# Patient Record
Sex: FEMALE | Race: WHITE
[De-identification: names, ages, dates, MRNs, and addresses within clinical notes are randomized per-mention and may not be internally consistent; named-entity substitution may affect disease eponyms.]

---

## 2018-05-24 ENCOUNTER — HOSPITAL ENCOUNTER (OUTPATIENT)
Dept: HOSPITAL 74 - FASU | Age: 48
Discharge: HOME | End: 2018-05-24
Attending: ORTHOPAEDIC SURGERY
Payer: COMMERCIAL

## 2018-05-24 VITALS — SYSTOLIC BLOOD PRESSURE: 121 MMHG | DIASTOLIC BLOOD PRESSURE: 79 MMHG | TEMPERATURE: 97.9 F | HEART RATE: 70 BPM

## 2018-05-24 VITALS — BODY MASS INDEX: 37.6 KG/M2

## 2018-05-24 DIAGNOSIS — S83.241A: Primary | ICD-10-CM
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
## 2018-05-24 PROCEDURE — 0SBC4ZZ EXCISION OF RIGHT KNEE JOINT, PERCUTANEOUS ENDOSCOPIC APPROACH: ICD-10-PCS | Performed by: ORTHOPAEDIC SURGERY

## 2018-05-30 NOTE — OP
DATE OF OPERATION:  05/24/2018

 

PREOPERATIVE DIAGNOSIS:  Right knee medial meniscal tear.

 

POSTOPERATIVE DIAGNOSIS:  Right knee medial meniscal tear.

 

PROCEDURE:  Right knee arthroscopy with partial medial meniscectomy.

 

ANESTHESIA:  General.

 

POSTOPERATIVE CONDITION:  Stable.

 

COMPLICATIONS:  None.

 

INDICATIONS:  This is a pleasant 48-year-old female who has been suffering from

medial knee pain.  Treatment options including nonoperative versus operative

treatment were reviewed.  Operative risks were reviewed in detail including bleeding,

infection, neurovascular injury, need for further surgery, postop pain and stiffness,

progression of osteoarthritis, iatrogenic cartilage injury.  We discussed medical

risks such as heart attack, stroke, DVT, PE, and death.  I addressed all the

patient's questions and concerns.  She voiced understanding and elected to proceed.

 

DESCRIPTION OF PROCEDURE:  The patient was brought to the operating room where

general anesthesia was administered.  The right lower extremity was then prepped and

draped in the usual sterile fashion.  A preoperative dose of antibiotics was given,

and the usual timeout procedure was performed.  The portal sites were now injected

subcutaneously with 0.25% Marcaine.  An 11 blade was used to establish the lateral

portal.  The arthroscope was now passed into the knee.  Examination of the

patellofemoral joint demonstrated some mild articular wear, more so laterally on the

patellar surface.  The trochlea was unremarkable.  Passing the arthroscope into the

notch demonstrated intact ACL and PCL.  Passing the arthroscope into the medial

compartment, a medial portal was established under spinal needle localization.  There

was high-grade partial thickness fissuring along the medial femoral condyle articular

surface.  On the posterior horn of the medial meniscus, a complex tear was noted. 

Utilizing a combination of meniscal biter and shaver, this was debrided down to a

stable base.  The arthroscope was now passed in the lateral compartment.  Here, there

were no articular lesions and no meniscal lesions seen upon probing.  The excess

fluid was now withdrawn from the joint.  The portals were sutured using 3-0 nylon. 

Sterile dressings were placed.  The patient was extubated and transferred to recovery

room in stable condition.

 

 

MITZY [id number]

DD: 05/24/2018 11:30

DT: 05/24/2018 13:33

Job #:  [id number]

## 2021-05-20 ENCOUNTER — HOSPITAL ENCOUNTER (EMERGENCY)
Facility: HOSPITAL | Age: 51
Discharge: HOME | End: 2021-05-20
Attending: EMERGENCY MEDICINE
Payer: COMMERCIAL

## 2021-05-20 ENCOUNTER — APPOINTMENT (EMERGENCY)
Dept: CT IMAGING | Facility: HOSPITAL | Age: 51
End: 2021-05-20
Payer: COMMERCIAL

## 2021-05-20 VITALS
TEMPERATURE: 97.6 F | RESPIRATION RATE: 22 BRPM | HEART RATE: 92 BPM | DIASTOLIC BLOOD PRESSURE: 65 MMHG | HEIGHT: 62 IN | OXYGEN SATURATION: 97 % | SYSTOLIC BLOOD PRESSURE: 132 MMHG | BODY MASS INDEX: 39.56 KG/M2 | WEIGHT: 215 LBS

## 2021-05-20 DIAGNOSIS — N20.1 URETEROLITHIASIS: Primary | ICD-10-CM

## 2021-05-20 DIAGNOSIS — R10.9 FLANK PAIN: ICD-10-CM

## 2021-05-20 LAB
ANION GAP SERPL CALCULATED.3IONS-SCNC: 6 MMOL/L (ref 4–13)
BACTERIA UR QL AUTO: ABNORMAL /HPF
BASOPHILS # BLD AUTO: 0.02 THOUSANDS/ΜL (ref 0–0.1)
BASOPHILS NFR BLD AUTO: 0 % (ref 0–1)
BILIRUB UR QL STRIP: NEGATIVE
BUN SERPL-MCNC: 16 MG/DL (ref 5–25)
CALCIUM SERPL-MCNC: 8.8 MG/DL (ref 8.3–10.1)
CHLORIDE SERPL-SCNC: 105 MMOL/L (ref 100–108)
CLARITY UR: ABNORMAL
CO2 SERPL-SCNC: 30 MMOL/L (ref 21–32)
COLOR UR: YELLOW
CREAT SERPL-MCNC: 0.84 MG/DL (ref 0.6–1.3)
EOSINOPHIL # BLD AUTO: 0.19 THOUSAND/ΜL (ref 0–0.61)
EOSINOPHIL NFR BLD AUTO: 2 % (ref 0–6)
ERYTHROCYTE [DISTWIDTH] IN BLOOD BY AUTOMATED COUNT: 13.9 % (ref 11.6–15.1)
EXT PREG TEST URINE: NEGATIVE
EXT. CONTROL ED NAV: NORMAL
GFR SERPL CREATININE-BSD FRML MDRD: 81 ML/MIN/1.73SQ M
GLUCOSE SERPL-MCNC: 147 MG/DL (ref 65–140)
GLUCOSE UR STRIP-MCNC: NEGATIVE MG/DL
HCT VFR BLD AUTO: 38.3 % (ref 34.8–46.1)
HGB BLD-MCNC: 12.1 G/DL (ref 11.5–15.4)
HGB UR QL STRIP.AUTO: ABNORMAL
IMM GRANULOCYTES # BLD AUTO: 0.02 THOUSAND/UL (ref 0–0.2)
IMM GRANULOCYTES NFR BLD AUTO: 0 % (ref 0–2)
KETONES UR STRIP-MCNC: NEGATIVE MG/DL
LEUKOCYTE ESTERASE UR QL STRIP: ABNORMAL
LYMPHOCYTES # BLD AUTO: 1.31 THOUSANDS/ΜL (ref 0.6–4.47)
LYMPHOCYTES NFR BLD AUTO: 11 % (ref 14–44)
MCH RBC QN AUTO: 28.5 PG (ref 26.8–34.3)
MCHC RBC AUTO-ENTMCNC: 31.6 G/DL (ref 31.4–37.4)
MCV RBC AUTO: 90 FL (ref 82–98)
MONOCYTES # BLD AUTO: 0.63 THOUSAND/ΜL (ref 0.17–1.22)
MONOCYTES NFR BLD AUTO: 5 % (ref 4–12)
MUCOUS THREADS UR QL AUTO: ABNORMAL
NEUTROPHILS # BLD AUTO: 9.99 THOUSANDS/ΜL (ref 1.85–7.62)
NEUTS SEG NFR BLD AUTO: 82 % (ref 43–75)
NITRITE UR QL STRIP: NEGATIVE
NON-SQ EPI CELLS URNS QL MICRO: ABNORMAL /HPF
PH UR STRIP.AUTO: 6 [PH]
PLATELET # BLD AUTO: 298 THOUSANDS/UL (ref 149–390)
PMV BLD AUTO: 10.2 FL (ref 8.9–12.7)
POTASSIUM SERPL-SCNC: 3.9 MMOL/L (ref 3.5–5.3)
PROT UR STRIP-MCNC: NEGATIVE MG/DL
RBC # BLD AUTO: 4.24 MILLION/UL (ref 3.81–5.12)
RBC #/AREA URNS AUTO: ABNORMAL /HPF
SODIUM SERPL-SCNC: 141 MMOL/L (ref 136–145)
SP GR UR STRIP.AUTO: >=1.03 (ref 1–1.03)
UROBILINOGEN UR QL STRIP.AUTO: 0.2 E.U./DL
WBC # BLD AUTO: 12.16 THOUSAND/UL (ref 4.31–10.16)
WBC #/AREA URNS AUTO: ABNORMAL /HPF

## 2021-05-20 PROCEDURE — 36415 COLL VENOUS BLD VENIPUNCTURE: CPT | Performed by: EMERGENCY MEDICINE

## 2021-05-20 PROCEDURE — 96375 TX/PRO/DX INJ NEW DRUG ADDON: CPT

## 2021-05-20 PROCEDURE — 99284 EMERGENCY DEPT VISIT MOD MDM: CPT | Performed by: EMERGENCY MEDICINE

## 2021-05-20 PROCEDURE — 85025 COMPLETE CBC W/AUTO DIFF WBC: CPT | Performed by: EMERGENCY MEDICINE

## 2021-05-20 PROCEDURE — 96374 THER/PROPH/DIAG INJ IV PUSH: CPT

## 2021-05-20 PROCEDURE — 81025 URINE PREGNANCY TEST: CPT | Performed by: EMERGENCY MEDICINE

## 2021-05-20 PROCEDURE — 81001 URINALYSIS AUTO W/SCOPE: CPT | Performed by: EMERGENCY MEDICINE

## 2021-05-20 PROCEDURE — 99284 EMERGENCY DEPT VISIT MOD MDM: CPT

## 2021-05-20 PROCEDURE — 80048 BASIC METABOLIC PNL TOTAL CA: CPT | Performed by: EMERGENCY MEDICINE

## 2021-05-20 PROCEDURE — 74176 CT ABD & PELVIS W/O CONTRAST: CPT

## 2021-05-20 RX ORDER — NAPROXEN 500 MG/1
500 TABLET ORAL 2 TIMES DAILY WITH MEALS
Qty: 14 TABLET | Refills: 0 | Status: SHIPPED | OUTPATIENT
Start: 2021-05-20 | End: 2021-06-10 | Stop reason: SDUPTHER

## 2021-05-20 RX ORDER — KETOROLAC TROMETHAMINE 30 MG/ML
15 INJECTION, SOLUTION INTRAMUSCULAR; INTRAVENOUS ONCE
Status: COMPLETED | OUTPATIENT
Start: 2021-05-20 | End: 2021-05-20

## 2021-05-20 RX ORDER — ONDANSETRON 4 MG/1
4 TABLET, ORALLY DISINTEGRATING ORAL EVERY 6 HOURS PRN
Qty: 20 TABLET | Refills: 0 | Status: SHIPPED | OUTPATIENT
Start: 2021-05-20

## 2021-05-20 RX ORDER — LEVOTHYROXINE SODIUM 0.1 MG/1
125 TABLET ORAL DAILY
COMMUNITY

## 2021-05-20 RX ORDER — OXYCODONE HYDROCHLORIDE 5 MG/1
5 TABLET ORAL EVERY 4 HOURS PRN
Qty: 8 TABLET | Refills: 0 | Status: SHIPPED | OUTPATIENT
Start: 2021-05-20 | End: 2021-05-30

## 2021-05-20 RX ORDER — MORPHINE SULFATE 4 MG/ML
4 INJECTION, SOLUTION INTRAMUSCULAR; INTRAVENOUS ONCE
Status: COMPLETED | OUTPATIENT
Start: 2021-05-20 | End: 2021-05-20

## 2021-05-20 RX ORDER — ONDANSETRON 2 MG/ML
4 INJECTION INTRAMUSCULAR; INTRAVENOUS ONCE
Status: COMPLETED | OUTPATIENT
Start: 2021-05-20 | End: 2021-05-20

## 2021-05-20 RX ADMIN — MORPHINE SULFATE 4 MG: 4 INJECTION INTRAVENOUS at 03:49

## 2021-05-20 RX ADMIN — ONDANSETRON 4 MG: 2 INJECTION INTRAMUSCULAR; INTRAVENOUS at 03:49

## 2021-05-20 RX ADMIN — KETOROLAC TROMETHAMINE 15 MG: 30 INJECTION, SOLUTION INTRAMUSCULAR at 03:49

## 2021-05-20 NOTE — ED PROVIDER NOTES
History  Chief Complaint   Patient presents with    Flank Pain     Pt to ED with c/o of right flank pain, pt states she has a history of kidney stones     49-year-old female past medical history of hypothyroidism presents for evaluation of right flank pain which started at midnight tonight, radiates to the suprapubic region, constant, sharp, associated nausea but no vomiting, with no relieving exacerbating factors, feels like similar kidney stones which required lithotripsy approximately 4 years ago at [facility] in [place],  Otherwise the patient is healthy, no history of gallbladder disease  No current chest pain or shortness of breath, no fever, no urinary complaint  SHe did not take any medications prior to arrival          Prior to Admission Medications   Prescriptions Last Dose Informant Patient Reported? Taking?   levothyroxine 100 mcg tablet   Yes Yes   Sig: Take 125 mcg by mouth daily      Facility-Administered Medications: None       Past Medical History:   Diagnosis Date    Hyperthyroidism        Past Surgical History:   Procedure Laterality Date    INGUINAL HERNIA REPAIR      KIDNEY STONE SURGERY         History reviewed  No pertinent family history  I have reviewed and agree with the history as documented  E-Cigarette/Vaping    E-Cigarette Use Never User      E-Cigarette/Vaping Substances    Nicotine No     THC No     CBD No     Flavoring No     Other No     Unknown No      Social History     Tobacco Use    Smoking status: Never Smoker    Smokeless tobacco: Never Used   Substance Use Topics    Alcohol use: Never     Frequency: Never    Drug use: Never       Review of Systems   Constitutional: Negative for appetite change and fever  HENT: Negative for rhinorrhea and sore throat  Eyes: Negative for photophobia and visual disturbance  Respiratory: Negative for cough, chest tightness and wheezing  Cardiovascular: Negative for chest pain, palpitations and leg swelling  Gastrointestinal: Negative for abdominal distention, abdominal pain, blood in stool, constipation and diarrhea  Genitourinary: Positive for flank pain  Negative for dysuria, frequency, hematuria and urgency  Musculoskeletal: Negative for back pain  Skin: Negative for rash  Neurological: Negative for dizziness, weakness and headaches  All other systems reviewed and are negative  Physical Exam  Physical Exam  Vitals signs and nursing note reviewed  Constitutional:       Appearance: She is well-developed  Comments: Uncomfortable appearing female in no acute respiratory distress   HENT:      Head: Normocephalic and atraumatic  Eyes:      Pupils: Pupils are equal, round, and reactive to light  Neck:      Musculoskeletal: Normal range of motion and neck supple  Cardiovascular:      Rate and Rhythm: Normal rate and regular rhythm  Heart sounds: No murmur  No friction rub  No gallop  Pulmonary:      Effort: Pulmonary effort is normal       Breath sounds: No wheezing or rales  Chest:      Chest wall: No tenderness  Abdominal:      General: There is no distension  Palpations: Abdomen is soft  There is no mass  Tenderness: There is no abdominal tenderness  There is no guarding or rebound  Comments: Soft nontender abdomen, right CVA tenderness   Skin:     General: Skin is warm and dry  Neurological:      Mental Status: She is alert and oriented to person, place, and time           Vital Signs  ED Triage Vitals   Temperature Pulse Respirations Blood Pressure SpO2   05/20/21 0342 05/20/21 0342 05/20/21 0342 05/20/21 0342 05/20/21 0342   97 6 °F (36 4 °C) 92 22 132/65 97 %      Temp src Heart Rate Source Patient Position - Orthostatic VS BP Location FiO2 (%)   -- -- -- -- --             Pain Score       05/20/21 0349       Worst Possible Pain           Vitals:    05/20/21 0342   BP: 132/65   Pulse: 92         Visual Acuity      ED Medications  Medications   ketorolac (TORADOL) injection 15 mg (15 mg Intravenous Given 5/20/21 0349)   morphine (PF) 4 mg/mL injection 4 mg (4 mg Intravenous Given 5/20/21 0349)   ondansetron (ZOFRAN) injection 4 mg (4 mg Intravenous Given 5/20/21 0349)       Diagnostic Studies  Results Reviewed     Procedure Component Value Units Date/Time    Basic metabolic panel [[id number]]  (Abnormal) Collected: 05/20/21 0348    Lab Status: Final result Specimen: Blood from Arm, Right Updated: 05/20/21 0409     Sodium 141 mmol/L      Potassium 3 9 mmol/L      Chloride 105 mmol/L      CO2 30 mmol/L      ANION GAP 6 mmol/L      BUN 16 mg/dL      Creatinine 0 84 mg/dL      Glucose 147 mg/dL      Calcium 8 8 mg/dL      eGFR 81 ml/min/1 73sq m     Narrative:      [place] guidelines for Chronic Kidney Disease (CKD):     Stage 1 with normal or high GFR (GFR > 90 mL/min/1 73 square meters)    Stage 2 Mild CKD (GFR = 60-89 mL/min/1 73 square meters)    Stage 3A Moderate CKD (GFR = 45-59 mL/min/1 73 square meters)    Stage 3B Moderate CKD (GFR = 30-44 mL/min/1 73 square meters)    Stage 4 Severe CKD (GFR = 15-29 mL/min/1 73 square meters)    Stage 5 End Stage CKD (GFR <15 mL/min/1 73 square meters)  Note: GFR calculation is accurate only with a steady state creatinine    Urine Microscopic [[id number]]  (Abnormal) Collected: 05/20/21 0344    Lab Status: Final result Specimen: Urine, Clean Catch Updated: 05/20/21 0358     RBC, UA 4-10 /hpf      WBC, UA 4-10 /hpf      Epithelial Cells Moderate /hpf      Bacteria, UA Occasional /hpf      MUCUS THREADS Occasional    CBC and differential [[id number]]  (Abnormal) Collected: 05/20/21 0348    Lab Status: Final result Specimen: Blood from Arm, Right Updated: 05/20/21 0356     WBC 12 16 Thousand/uL      RBC 4 24 Million/uL      Hemoglobin 12 1 g/dL      Hematocrit 38 3 %      MCV 90 fL      MCH 28 5 pg      MCHC 31 6 g/dL      RDW 13 9 %      MPV 10 2 fL      Platelets 263 Thousands/uL      Neutrophils Relative 82 %      Immat GRANS % 0 %      Lymphocytes Relative 11 %      Monocytes Relative 5 %      Eosinophils Relative 2 %      Basophils Relative 0 %      Neutrophils Absolute 9 99 Thousands/µL      Immature Grans Absolute 0 02 Thousand/uL      Lymphocytes Absolute 1 31 Thousands/µL      Monocytes Absolute 0 63 Thousand/µL      Eosinophils Absolute 0 19 Thousand/µL      Basophils Absolute 0 02 Thousands/µL     UA w Reflex to Microscopic w Reflex to Culture [[id number]]  (Abnormal) Collected: 05/20/21 0344    Lab Status: Final result Specimen: Urine, Clean Catch Updated: 05/20/21 0352     Color, UA Yellow     Clarity, UA Slightly Cloudy     Specific Gravity, UA >=1 030     pH, UA 6 0     Leukocytes, UA Trace     Nitrite, UA Negative     Protein, UA Negative mg/dl      Glucose, UA Negative mg/dl      Ketones, UA Negative mg/dl      Urobilinogen, UA 0 2 E U /dl      Bilirubin, UA Negative     Blood, UA Moderate    POCT pregnancy, urine [[id number]]  (Normal) Resulted: 05/20/21 0348    Lab Status: Final result Updated: 05/20/21 0348     EXT PREG TEST UR (Ref: Negative) negative     Control valid                 CT renal stone study abdomen pelvis wo contrast   Final Result by Navarro Walker DO (05/20 5254)      Mild right-sided hydroureteronephrosis with a 3 mm obstructing stone in the distal right ureter        Workstation performed: [id number]                    Procedures  Procedures         ED Course  ED Course as of May 20 0443   Thu May 20, 2021   6603 Patient feeling comfortable, pain resolved, does have tiny obstructing distal kidney stone, no evidence of urinary tract infection, she has an appointment with the primary care provider next week and is planning on seeing urologist, careful return precautions given                                              MDM  Number of Diagnoses or Management Options  Diagnosis management comments: 66-year-old female presents for evaluation of right flank pain, similar to previous kidney stones, will obtain lab work, UA, renal CT, will re-evaluate      Disposition  Final diagnoses:   Ureterolithiasis   Flank pain     Time reflects when diagnosis was documented in both MDM as applicable and the Disposition within this note     Time User Action Codes Description Comment    5/20/2021  4:39 AM Gerry Rater Add [N20 1] Ureterolithiasis     5/20/2021  4:39 AM Gerry Rater Add [R10 9] Flank pain       ED Disposition     ED Disposition Condition Date/Time Comment    Discharge Stable Thu May 20, 2021  4:42 AM César Taylor discharge to home/self care              Follow-up Information     Follow up With Specialties Details Why Contact Info Additional Information    Renata Carr DO Family Medicine Schedule an appointment as soon as possible for a visit   [address] [facility] 22 [phone]        [address] Emergency Department Emergency Medicine  If symptoms worsen [address] [place], [phone]  [address] [facility] Emergency Department, [address], [place], [address]    [facility] Urology Schedule an appointment as soon as possible for a visit   [address] [phone]  [address]  [facility] [address], [place], [place], [address]          Patient's Medications   Discharge Prescriptions    NAPROXEN (NAPROSYN) 500 MG TABLET    Take 1 tablet (500 mg total) by mouth 2 (two) times a day with meals       Start Date: 5/20/2021 End Date: --       Order Dose: 500 mg       Quantity: 14 tablet    Refills: 0    ONDANSETRON (ZOFRAN-ODT) 4 MG DISINTEGRATING TABLET    Take 1 tablet (4 mg total) by mouth every 6 (six) hours as needed for nausea or vomiting       Start Date: 5/20/2021 End Date: --       Order Dose: 4 mg       Quantity: 20 tablet    Refills: 0    OXYCODONE (ROXICODONE) 5 MG IMMEDIATE RELEASE TABLET    Take 1 tablet (5 mg total) by mouth every 4 (four) hours as needed for moderate pain for up to 10 daysMax Daily Amount: 30 mg       Start Date: 5/20/2021 End Date: 5/30/2021       Order Dose: 5 mg       Quantity: 8 tablet    Refills: 0     No discharge procedures on file      PDMP Review     None          ED Provider  Electronically Signed by           Susy Schuler DO  05/20/21 1459

## 2021-06-10 ENCOUNTER — HOSPITAL ENCOUNTER (EMERGENCY)
Facility: HOSPITAL | Age: 51
Discharge: HOME | End: 2021-06-10
Attending: EMERGENCY MEDICINE | Admitting: EMERGENCY MEDICINE
Payer: COMMERCIAL

## 2021-06-10 ENCOUNTER — APPOINTMENT (EMERGENCY)
Dept: CT IMAGING | Facility: HOSPITAL | Age: 51
End: 2021-06-10
Payer: COMMERCIAL

## 2021-06-10 VITALS
BODY MASS INDEX: 39.32 KG/M2 | DIASTOLIC BLOOD PRESSURE: 58 MMHG | TEMPERATURE: 97.8 F | HEART RATE: 67 BPM | OXYGEN SATURATION: 96 % | SYSTOLIC BLOOD PRESSURE: 108 MMHG | RESPIRATION RATE: 17 BRPM | WEIGHT: 215 LBS

## 2021-06-10 DIAGNOSIS — N20.1 URETEROLITHIASIS: Primary | ICD-10-CM

## 2021-06-10 LAB
BACTERIA UR QL AUTO: ABNORMAL /HPF
BILIRUB UR QL STRIP: NEGATIVE
CLARITY UR: CLEAR
COLOR UR: YELLOW
GLUCOSE UR STRIP-MCNC: NEGATIVE MG/DL
HGB UR QL STRIP.AUTO: ABNORMAL
HYALINE CASTS #/AREA URNS LPF: ABNORMAL /LPF
KETONES UR STRIP-MCNC: NEGATIVE MG/DL
LEUKOCYTE ESTERASE UR QL STRIP: NEGATIVE
NITRITE UR QL STRIP: NEGATIVE
NON-SQ EPI CELLS URNS QL MICRO: ABNORMAL /HPF
PH UR STRIP.AUTO: 6 [PH]
PROT UR STRIP-MCNC: NEGATIVE MG/DL
RBC #/AREA URNS AUTO: ABNORMAL /HPF
SP GR UR STRIP.AUTO: 1.02 (ref 1–1.03)
UROBILINOGEN UR QL STRIP.AUTO: 0.2 E.U./DL
WBC #/AREA URNS AUTO: ABNORMAL /HPF

## 2021-06-10 PROCEDURE — 99284 EMERGENCY DEPT VISIT MOD MDM: CPT

## 2021-06-10 PROCEDURE — 96372 THER/PROPH/DIAG INJ SC/IM: CPT

## 2021-06-10 PROCEDURE — 81001 URINALYSIS AUTO W/SCOPE: CPT | Performed by: PHYSICIAN ASSISTANT

## 2021-06-10 PROCEDURE — 74176 CT ABD & PELVIS W/O CONTRAST: CPT

## 2021-06-10 PROCEDURE — 99285 EMERGENCY DEPT VISIT HI MDM: CPT | Performed by: PHYSICIAN ASSISTANT

## 2021-06-10 PROCEDURE — G1004 CDSM NDSC: HCPCS

## 2021-06-10 RX ORDER — TAMSULOSIN HYDROCHLORIDE 0.4 MG/1
0.4 CAPSULE ORAL
Qty: 7 CAPSULE | Refills: 0 | Status: SHIPPED | OUTPATIENT
Start: 2021-06-10 | End: 2021-06-17

## 2021-06-10 RX ORDER — MORPHINE SULFATE 4 MG/ML
4 INJECTION, SOLUTION INTRAMUSCULAR; INTRAVENOUS ONCE
Status: COMPLETED | OUTPATIENT
Start: 2021-06-10 | End: 2021-06-10

## 2021-06-10 RX ORDER — NAPROXEN 500 MG/1
500 TABLET ORAL 2 TIMES DAILY WITH MEALS
Qty: 14 TABLET | Refills: 0 | Status: SHIPPED | OUTPATIENT
Start: 2021-06-10

## 2021-06-10 RX ORDER — KETOROLAC TROMETHAMINE 30 MG/ML
15 INJECTION, SOLUTION INTRAMUSCULAR; INTRAVENOUS ONCE
Status: COMPLETED | OUTPATIENT
Start: 2021-06-10 | End: 2021-06-10

## 2021-06-10 RX ADMIN — MORPHINE SULFATE 4 MG: 4 INJECTION INTRAVENOUS at 11:23

## 2021-06-10 RX ADMIN — KETOROLAC TROMETHAMINE 15 MG: 30 INJECTION, SOLUTION INTRAMUSCULAR at 11:23

## 2021-06-10 NOTE — ED PROVIDER NOTES
History  Chief Complaint   Patient presents with    Flank Pain     To ED with right sided flank pain started at 0830 this morning with associated nausea  Has hx of kidney stones      80-year-old female with history of recurrent kidney stones and hyperthyroidism presents to the emergency department for evaluation of right flank pain beginning this morning  Pain is consistent and similar to prior kidney stone 2-3 weeks ago  She has required stenting once  Denies fevers, chills, sweats, hematuria, nausea, vomiting, or diarrhea  Has not taken any medication for pain control today  Prior to Admission Medications   Prescriptions Last Dose Informant Patient Reported? Taking?   levothyroxine 100 mcg tablet   Yes No   Sig: Take 125 mcg by mouth daily   naproxen (NAPROSYN) 500 mg tablet   No No   Sig: Take 1 tablet (500 mg total) by mouth 2 (two) times a day with meals   naproxen (NAPROSYN) 500 mg tablet   No No   Sig: Take 1 tablet (500 mg total) by mouth 2 (two) times a day with meals   ondansetron (ZOFRAN-ODT) 4 mg disintegrating tablet   No No   Sig: Take 1 tablet (4 mg total) by mouth every 6 (six) hours as needed for nausea or vomiting      Facility-Administered Medications: None       Past Medical History:   Diagnosis Date    Hyperthyroidism        Past Surgical History:   Procedure Laterality Date    INGUINAL HERNIA REPAIR      KIDNEY STONE SURGERY         History reviewed  No pertinent family history  I have reviewed and agree with the history as documented      E-Cigarette/Vaping    E-Cigarette Use Never User      E-Cigarette/Vaping Substances    Nicotine No     THC No     CBD No     Flavoring No     Other No     Unknown No      Social History     Tobacco Use    Smoking status: Never Smoker    Smokeless tobacco: Never Used   Substance Use Topics    Alcohol use: Never     Frequency: Never    Drug use: Never       Review of Systems   Constitutional: Negative for chills, diaphoresis and fever    Eyes: Negative for visual disturbance  Respiratory: Negative for cough and shortness of breath  Cardiovascular: Negative for chest pain and palpitations  Gastrointestinal: Negative for abdominal pain, diarrhea, nausea and vomiting  Genitourinary: Positive for flank pain  Negative for dysuria and frequency  Musculoskeletal: Negative for arthralgias and myalgias  Skin: Negative for color change, rash and wound  Allergic/Immunologic: Negative for immunocompromised state  Neurological: Negative for dizziness and light-headedness  Hematological: Does not bruise/bleed easily  Psychiatric/Behavioral: Negative for confusion  The patient is not nervous/anxious  Physical Exam  Physical Exam  Vitals signs reviewed  Constitutional:       General: She is not in acute distress  Appearance: She is well-developed  She is not diaphoretic  HENT:      Head: Normocephalic and atraumatic  Mouth/Throat:      Mouth: Mucous membranes are moist    Eyes:      General: No scleral icterus  Pupils: Pupils are equal, round, and reactive to light  Neck:      Vascular: No JVD  Cardiovascular:      Rate and Rhythm: Normal rate and regular rhythm  Heart sounds: No murmur  No friction rub  No gallop  Pulmonary:      Effort: No respiratory distress  Breath sounds: No wheezing or rales  Abdominal:      General: Bowel sounds are normal  There is no distension  Palpations: Abdomen is soft  There is no mass  Tenderness: There is no abdominal tenderness  There is no guarding or rebound  Skin:     General: Skin is warm and dry  Capillary Refill: Capillary refill takes less than 2 seconds  Coloration: Skin is not pale  Neurological:      General: No focal deficit present  Mental Status: She is alert and oriented to person, place, and time     Psychiatric:         Mood and Affect: Mood normal          Behavior: Behavior normal          Vital Signs  ED Triage Vitals [06/10/21 1048]   Temperature Pulse Respirations Blood Pressure SpO2   97 8 °F (36 6 °C) 77 20 111/63 99 %      Temp Source Heart Rate Source Patient Position - Orthostatic VS BP Location FiO2 (%)   Tympanic Monitor Sitting Right arm --      Pain Score       9           Vitals:    06/10/21 1048 06/10/21 1230   BP: 111/63 108/58   Pulse: 77 67   Patient Position - Orthostatic VS: Sitting Lying         Visual Acuity      ED Medications  Medications   morphine (PF) 4 mg/mL injection 4 mg (4 mg Intramuscular Given 6/10/21 1123)   ketorolac (TORADOL) injection 15 mg (15 mg Intramuscular Given 6/10/21 1123)       Diagnostic Studies  Results Reviewed     Procedure Component Value Units Date/Time    Urine Microscopic [[id number]]  (Abnormal) Collected: 06/10/21 1123    Lab Status: Final result Specimen: Urine, Clean Catch Updated: 06/10/21 1151     RBC, UA 4-10 /hpf      WBC, UA 2-4 /hpf      Epithelial Cells Moderate /hpf      Bacteria, UA Occasional /hpf      Hyaline Casts, UA 2-4 /lpf     UA w Reflex to Microscopic w Reflex to Culture [[id number]]  (Abnormal) Collected: 06/10/21 1123    Lab Status: Final result Specimen: Urine, Clean Catch Updated: 06/10/21 1132     Color, UA Yellow     Clarity, UA Clear     Specific [place], UA 1 020     pH, UA 6 0     Leukocytes, UA Negative     Nitrite, UA Negative     Protein, UA Negative mg/dl      Glucose, UA Negative mg/dl      Ketones, UA Negative mg/dl      Urobilinogen, UA 0 2 E U /dl      Bilirubin, UA Negative     Blood, UA Small                 CT renal stone study abdomen pelvis wo contrast   Final Result by Zach Garcia MD (06/10 1228)      Stable mild right hydroureteronephrosis secondary to a 4 mm ureterovesical junction calculus               Workstation performed: [id number]                    Procedures  Procedures         ED Course                             SBIRT 20yo+      Most Recent Value   SBIRT (24 yo +)   In order to provide better care to our patients, we are screening all of our patients for alcohol and drug use  Would it be okay to ask you these screening questions? No Filed at: 06/10/2021 1100                    MDM  Number of Diagnoses or Management Options  Ureterolithiasis: new and requires workup  Diagnosis management comments:   Small distal stone UA unremarkable  Pain resolved at time of discharge  Expected management discussed, recommend outpatient Urology follow-up for further risk reduction       Amount and/or Complexity of Data Reviewed  Clinical lab tests: ordered and reviewed  Tests in the radiology section of CPT®: ordered and reviewed  Tests in the medicine section of CPT®: ordered and reviewed  Review and summarize past medical records: yes  Independent visualization of images, tracings, or specimens: yes        Disposition  Final diagnoses:   Ureterolithiasis     Time reflects when diagnosis was documented in both MDM as applicable and the Disposition within this note     Time User Action Codes Description Comment    6/10/2021 12:35 PM Lang Douglas, [address] [facility] 71 [N20 1] Ureterolithiasis       ED Disposition     ED Disposition Condition Date/Time Comment    Discharge Stable Thu Mark 10, 2021 12:35 PM Oscar Salas discharge to home/self care              Follow-up Information     Follow up With Specialties Details Why Contact Info Additional [address] [facility] 2 [place] [facility] [place] [facility] Schedule an appointment as soon as possible for a visit   [address] [facility] [phone]  701  [facility] 96, [facility], [place], [facility] 48          Discharge Medication List as of 6/10/2021 12:36 PM      START taking these medications    Details   tamsulosin (FLOMAX) 0 4 mg Take 1 capsule (0 4 mg total) by mouth daily with dinner for 7 days, Starting Thu 6/10/2021, Until Thu 6/17/2021, Normal         CONTINUE these medications which have CHANGED    Details   naproxen (NAPROSYN) 500 mg tablet Take 1 tablet (500 mg total) by mouth 2 (two) times a day with meals, Starting Thu 6/10/2021, Normal         CONTINUE these medications which have NOT CHANGED    Details   levothyroxine 100 mcg tablet Take 125 mcg by mouth daily, Historical Med      ondansetron (ZOFRAN-ODT) 4 mg disintegrating tablet Take 1 tablet (4 mg total) by mouth every 6 (six) hours as needed for nausea or vomiting, Starting Thu 5/20/2021, Normal           No discharge procedures on file      PDMP Review     None          ED Provider  Electronically Signed by           Brandon Canales PA-C  06/10/21 0529

## 2021-12-20 ENCOUNTER — OFFICE VISIT (OUTPATIENT)
Dept: URGENT CARE | Facility: CLINIC | Age: 51
End: 2021-12-20
Payer: COMMERCIAL

## 2021-12-20 VITALS
TEMPERATURE: 98.4 F | BODY MASS INDEX: 39.32 KG/M2 | WEIGHT: 215 LBS | HEART RATE: 84 BPM | RESPIRATION RATE: 18 BRPM | SYSTOLIC BLOOD PRESSURE: 110 MMHG | DIASTOLIC BLOOD PRESSURE: 74 MMHG | OXYGEN SATURATION: 95 %

## 2021-12-20 DIAGNOSIS — J06.9 ACUTE URI: Primary | ICD-10-CM

## 2021-12-20 DIAGNOSIS — R05.9 COUGH: ICD-10-CM

## 2021-12-20 PROCEDURE — 87636 SARSCOV2 & INF A&B AMP PRB: CPT | Performed by: PHYSICIAN ASSISTANT

## 2021-12-20 PROCEDURE — G0382 LEV 3 HOSP TYPE B ED VISIT: HCPCS | Performed by: PHYSICIAN ASSISTANT

## 2021-12-20 RX ORDER — FLUTICASONE PROPIONATE 50 MCG
2 SPRAY, SUSPENSION (ML) NASAL DAILY
Qty: 16 G | Refills: 0 | Status: SHIPPED | OUTPATIENT
Start: 2021-12-20

## 2021-12-20 RX ORDER — BENZONATATE 200 MG/1
200 CAPSULE ORAL 3 TIMES DAILY PRN
Qty: 20 CAPSULE | Refills: 0 | Status: SHIPPED | OUTPATIENT
Start: 2021-12-20

## 2021-12-21 LAB
FLUAV RNA RESP QL NAA+PROBE: NEGATIVE
FLUBV RNA RESP QL NAA+PROBE: NEGATIVE
SARS-COV-2 RNA RESP QL NAA+PROBE: POSITIVE

## 2022-04-07 ENCOUNTER — OFFICE VISIT (OUTPATIENT)
Dept: URGENT CARE | Facility: CLINIC | Age: 52
End: 2022-04-07
Payer: COMMERCIAL

## 2022-04-07 ENCOUNTER — APPOINTMENT (OUTPATIENT)
Dept: RADIOLOGY | Facility: CLINIC | Age: 52
End: 2022-04-07
Payer: COMMERCIAL

## 2022-04-07 VITALS
DIASTOLIC BLOOD PRESSURE: 66 MMHG | RESPIRATION RATE: 20 BRPM | BODY MASS INDEX: 38.64 KG/M2 | SYSTOLIC BLOOD PRESSURE: 121 MMHG | OXYGEN SATURATION: 99 % | TEMPERATURE: 99.1 F | HEIGHT: 62 IN | WEIGHT: 210 LBS | HEART RATE: 77 BPM

## 2022-04-07 DIAGNOSIS — S99.911A INJURY OF RIGHT ANKLE, INITIAL ENCOUNTER: ICD-10-CM

## 2022-04-07 DIAGNOSIS — S99.911A INJURY OF RIGHT ANKLE, INITIAL ENCOUNTER: Primary | ICD-10-CM

## 2022-04-07 PROCEDURE — 73610 X-RAY EXAM OF ANKLE: CPT

## 2022-04-07 PROCEDURE — G0382 LEV 3 HOSP TYPE B ED VISIT: HCPCS | Performed by: PHYSICIAN ASSISTANT

## 2022-04-07 RX ORDER — LEVOTHYROXINE SODIUM 0.15 MG/1
150 TABLET ORAL DAILY
COMMUNITY
Start: 2022-01-26

## 2022-04-07 NOTE — PROGRESS NOTES
[address] [employer]        NAME: Javier Fernandez is a 46 y o  female  : 1970    MRN: [id number]  DATE: 2022  TIME: 1:03 PM    Assessment and Plan   Injury of right ankle, initial encounter [S99 911A]  1  Injury of right ankle, initial encounter  XR ankle 3+ vw right    Ambulatory Referral to Orthopedic Surgery         Patient Instructions     Recommend RICE, OTC ibuprofen/tylenol  Referred to Orthopedics  Monitor for worsening symptoms  CAM boot applied  Follow up with PCP in 3-5 days  Proceed to  ER if symptoms worsen  Chief Complaint     Chief Complaint   Patient presents with    Ankle Pain     right ankle, injury occured 1 hour ago, pt tripped on last step twisted ankle and hear a snap, instant pain, swelling noted, not able to bear weight, no ice or meds used at this time         History of Present Illness       Patient presents with complaint of right ankle pain x 1 hr  She states that she was walking down stairs when she missed a step, rolled her right ankle, and fell  She reports hearing a loud cracking sound  She states that she was unable to stand immediately after the injury  She describes swelling and constant pain, exacerbated by movement and pressure  Pt denies trying any palliative measures  Pt describes paresthesias from her ankle and extending into her foot  Pt denies hitting her head  Review of Systems   Review of Systems   Constitutional: Negative for chills and fever  HENT: Negative for ear pain and sore throat  Eyes: Negative for pain and visual disturbance  Respiratory: Negative for cough and shortness of breath  Cardiovascular: Negative for chest pain and palpitations  Gastrointestinal: Negative for abdominal pain and vomiting  Genitourinary: Negative for dysuria and hematuria  Musculoskeletal: Positive for arthralgias, gait problem and joint swelling  Negative for back pain  Skin: Negative for color change and rash     Neurological: Negative for seizures and syncope  All other systems reviewed and are negative  Current Medications       Current Outpatient Medications:     levothyroxine 150 mcg tablet, Take 150 mcg by mouth daily, Disp: , Rfl:     benzonatate (TESSALON) 200 MG capsule, Take 1 capsule (200 mg total) by mouth 3 (three) times a day as needed for cough (Patient not taking: Reported on 4/7/2022 ), Disp: 20 capsule, Rfl: 0    fluticasone (FLONASE) 50 mcg/act nasal spray, 2 sprays into each nostril daily (Patient not taking: Reported on 4/7/2022 ), Disp: 16 g, Rfl: 0    levothyroxine 100 mcg tablet, Take 125 mcg by mouth daily (Patient not taking: Reported on 4/7/2022 ), Disp: , Rfl:     naproxen (NAPROSYN) 500 mg tablet, Take 1 tablet (500 mg total) by mouth 2 (two) times a day with meals (Patient not taking: Reported on 4/7/2022 ), Disp: 14 tablet, Rfl: 0    ondansetron (ZOFRAN-ODT) 4 mg disintegrating tablet, Take 1 tablet (4 mg total) by mouth every 6 (six) hours as needed for nausea or vomiting (Patient not taking: Reported on 4/7/2022 ), Disp: 20 tablet, Rfl: 0    tamsulosin (FLOMAX) 0 4 mg, Take 1 capsule (0 4 mg total) by mouth daily with dinner for 7 days, Disp: 7 capsule, Rfl: 0    Current Allergies     Allergies as of 04/07/2022 - Reviewed 04/07/2022   Allergen Reaction Noted    Other Other (See Comments) 10/28/2021            The following portions of the patient's history were reviewed and updated as appropriate: allergies, current medications, past family history, past medical history, past social history, past surgical history and problem list      Past Medical History:   Diagnosis Date    Hyperthyroidism        Past Surgical History:   Procedure Laterality Date    INGUINAL HERNIA REPAIR      KIDNEY STONE SURGERY         No family history on file  Medications have been verified          Objective   /66   Pulse 77   Temp 99 1 °F (37 3 °C) (Tympanic)   Resp 20   Ht 5' 2" (1 575 m)   Wt 95 3 kg (210 lb)   SpO2 99%   BMI 38 41 kg/m²   No LMP recorded  Patient is postmenopausal          Physical Exam     Physical Exam  Vitals and nursing note reviewed  Constitutional:       General: She is not in acute distress  Appearance: Normal appearance  She is well-developed  She is not ill-appearing or diaphoretic  HENT:      Head: Normocephalic and atraumatic  Eyes:      Conjunctiva/sclera: Conjunctivae normal       Pupils: Pupils are equal, round, and reactive to light  Cardiovascular:      Rate and Rhythm: Normal rate and regular rhythm  Heart sounds: Normal heart sounds  Pulmonary:      Effort: Pulmonary effort is normal  No respiratory distress  Breath sounds: Normal breath sounds  No stridor  Musculoskeletal:         General: Swelling, tenderness and signs of injury present  Cervical back: Normal range of motion and neck supple  Comments: Right ankle: diffuse swelling; AROM limited in all directions; distal sensation intact; TTP over anterior aspect and lateral malleolus  Right foot: mild tenderness extending to proximal 5th metatarsal   Lymphadenopathy:      Cervical: No cervical adenopathy  Skin:     General: Skin is warm and dry  Capillary Refill: Capillary refill takes less than 2 seconds  Findings: No rash  Neurological:      Mental Status: She is alert and oriented to person, place, and time  Cranial Nerves: No cranial nerve deficit  Sensory: No sensory deficit  Psychiatric:         Behavior: Behavior normal          Thought Content: Thought content normal        Splint application    Date/Time: 4/7/2022 1:01 PM  Performed by: Oseas Orellana PA-C  Authorized by: Oseas Orellana PA-C   Universal Protocol:  Consent: Verbal consent obtained    Risks and benefits: risks, benefits and alternatives were discussed  Consent given by: patient  Time out: Immediately prior to procedure a "time out" was called to verify the correct patient, procedure, equipment, support staff and site/side marked as required  Patient understanding: patient states understanding of the procedure being performed  Patient consent: the patient's understanding of the procedure matches consent given  Procedure consent: procedure consent matches procedure scheduled  Required items: required blood products, implants, devices, and special equipment available  Patient identity confirmed: verbally with patient      Pre-procedure details:     Sensation:  Normal  Procedure details:     Laterality:  Right    Location:  Ankle    Ankle:  R ankle    Splint type: CAM boot  Supplies:  Elastic bandage  Post-procedure details:     Pain:  Unchanged    Sensation:  Normal    Patient tolerance of procedure: Tolerated well, no immediate complications  Comments:      Patient tolerated procedure well  Discussed RICE, OTC pain meds, and adjusting tightness of boot as needed  Referred to Orthopedics as needed

## 2022-04-13 ENCOUNTER — OFFICE VISIT (OUTPATIENT)
Dept: OBGYN CLINIC | Facility: CLINIC | Age: 52
End: 2022-04-13
Payer: COMMERCIAL

## 2022-04-13 VITALS
DIASTOLIC BLOOD PRESSURE: 64 MMHG | HEIGHT: 62 IN | BODY MASS INDEX: 38.64 KG/M2 | SYSTOLIC BLOOD PRESSURE: 120 MMHG | WEIGHT: 210 LBS

## 2022-04-13 DIAGNOSIS — S93.491A SPRAIN OF ANTERIOR TALOFIBULAR LIGAMENT OF RIGHT ANKLE, INITIAL ENCOUNTER: Primary | ICD-10-CM

## 2022-04-13 PROCEDURE — 99203 OFFICE O/P NEW LOW 30 MIN: CPT | Performed by: PHYSICIAN ASSISTANT

## 2022-04-13 RX ORDER — MELOXICAM 15 MG/1
15 TABLET ORAL DAILY
Qty: 30 TABLET | Refills: 0 | Status: SHIPPED | OUTPATIENT
Start: 2022-04-13

## 2022-04-13 NOTE — PROGRESS NOTES
Patient Name:  Mark Ware  MRN:  [id number]    Assessment & Plan     Right ankle sprain 4/7/22:  1  Referral to physical therapy  2  Continue Cam walking boot  Wean from Cam walking boot once able to tolerate full weight in the boot without pain  3  Prescription for meloxicam   4  Follow-up in six weeks with primary care sports medicine    Chief Complaint     Right ankle injury    History of the Present Illness     Baker Baumgarten Alfreda Foot is a 46 y o  female who reports to the office today for evaluation of her right ankle  Patient sustained an inversion-type injury to her right ankle on 4/7/22 while walking down the steps  She noted an onset of severe pain and swelling localized primarily to the lateral aspect  She also noted bruising as well  She did report to urgent care x-rays were performed and she was placed in a Cam walking boot  She still notes persistent pain and swelling on the lateral aspect of the ankle  Pain is worse with bearing weight and ambulating  She notes weakness secondary to pain  No instability  No numbness or tingling no fevers or chills  She has been taking over-the-counter anti-inflammatories with limited improvement  Physical Exam     /64   Ht 5' 2" (1 575 m)   Wt 95 3 kg (210 lb)   BMI 38 41 kg/m²     Right ankle:  No gross deformity  Skin intact  Soft tissue swelling and ecchymosis are present laterally  No erythema  No tenderness to palpation along the distal fibula  There is tenderness to palpation ATFL  Mild tenderness to palpation deltoid ligaments  No tenderness to palpation medial malleolus  No tenderness to palpation fibular head  Ankle range of motion is intact and limited with discomfort  Positive talar tilt test   Negative anterior drawer test   Negative squeeze test   Toes warm and mobile  Eyes: Anicteric sclerae  ENT: Trachea midline  Lungs: Normal respiratory effort  CV: Capillary refill is less than 2 seconds    Skin: Intact without erythema  Lymph: No palpable lymphadenopathy  Neuro: Sensation is grossly intact to light touch  Psych: Mood and affect are appropriate  Data Review     I have personally reviewed pertinent films in PACS, and my interpretation follows:    X-rays right ankle 4/7/22:  No acute osseous abnormalities  No fracture or dislocation noted  Soft tissue swelling appreciated over the lateral malleolus  Past Medical History:   Diagnosis Date    Hyperthyroidism        Past Surgical History:   Procedure Laterality Date    INGUINAL HERNIA REPAIR      KIDNEY STONE SURGERY         Allergies   Allergen Reactions    Other Other (See Comments)       Current Outpatient Medications on File Prior to Visit   Medication Sig Dispense Refill    benzonatate (TESSALON) 200 MG capsule Take 1 capsule (200 mg total) by mouth 3 (three) times a day as needed for cough (Patient not taking: Reported on 4/7/2022 ) 20 capsule 0    fluticasone (FLONASE) 50 mcg/act nasal spray 2 sprays into each nostril daily (Patient not taking: Reported on 4/7/2022 ) 16 g 0    levothyroxine 100 mcg tablet Take 125 mcg by mouth daily (Patient not taking: Reported on 4/7/2022 )      levothyroxine 150 mcg tablet Take 150 mcg by mouth daily      naproxen (NAPROSYN) 500 mg tablet Take 1 tablet (500 mg total) by mouth 2 (two) times a day with meals (Patient not taking: Reported on 4/7/2022 ) 14 tablet 0    ondansetron (ZOFRAN-ODT) 4 mg disintegrating tablet Take 1 tablet (4 mg total) by mouth every 6 (six) hours as needed for nausea or vomiting (Patient not taking: Reported on 4/7/2022 ) 20 tablet 0    tamsulosin (FLOMAX) 0 4 mg Take 1 capsule (0 4 mg total) by mouth daily with dinner for 7 days 7 capsule 0     No current facility-administered medications on file prior to visit         Social History     Tobacco Use    Smoking status: Never Smoker    Smokeless tobacco: Never Used   Vaping Use    Vaping Use: Never used   Substance Use Topics    Alcohol use: Never    Drug use: Never       History reviewed  No pertinent family history  Review of Systems     As stated in the HPI  All other systems reviewed and are negative

## 2022-04-15 ENCOUNTER — EVALUATION (OUTPATIENT)
Dept: PHYSICAL THERAPY | Facility: CLINIC | Age: 52
End: 2022-04-15
Payer: COMMERCIAL

## 2022-04-15 DIAGNOSIS — S93.491A SPRAIN OF ANTERIOR TALOFIBULAR LIGAMENT OF RIGHT ANKLE, INITIAL ENCOUNTER: Primary | ICD-10-CM

## 2022-04-15 PROCEDURE — 97110 THERAPEUTIC EXERCISES: CPT | Performed by: PHYSICAL THERAPIST

## 2022-04-15 PROCEDURE — 97140 MANUAL THERAPY 1/> REGIONS: CPT | Performed by: PHYSICAL THERAPIST

## 2022-04-15 PROCEDURE — 97161 PT EVAL LOW COMPLEX 20 MIN: CPT | Performed by: PHYSICAL THERAPIST

## 2022-04-15 NOTE — PROGRESS NOTES
PT Evaluation     Today's date: 4/15/2022  Patient name: Mita Goel  : 1970  MRN: [id number]  Referring provider: [place]*  Dx:   Encounter Diagnosis     ICD-10-CM    1  Sprain of anterior talofibular ligament of right ankle, initial encounter  S93 491A Ambulatory Referral to Physical Therapy                  Assessment  Assessment details: Pt is 47yo female presenting following an acute ankle sprain 7 days ago  Pt has edema, bruising, decreased rom, ad decreased strength due to pain  These impairments limit her ability to weight bear, stand, walk, and complete ADL's around her house  Pt would benefit from PT  Services to improve these impairments to return to [facility]  Impairments: abnormal gait, abnormal or restricted ROM, activity intolerance, impaired physical strength, lacks appropriate home exercise program, pain with function and weight-bearing intolerance  Functional limitations: standing, walking, stairs, pickleball  Symptom irritability: moderateUnderstanding of Dx/Px/POC: good   Prognosis: good    Goals  1  Pt will demonstrate improved ankle rom in all directions to [facility] to negotiate stairs in 4 weeks  2  Pt will show improved swelling by decreasing figure 8 by 1 cm in 4 weeks  3  Pt will improve ankle strength to 5/5 to be able to ambulate with normal gait mechanics in 6 weeks  4  Pt will be independent with HEP upon discharge      Plan  Patient would benefit from: skilled physical therapy  Planned modality interventions: low level laser therapy and cryotherapy  Planned therapy interventions: functional ROM exercises, therapeutic activities, therapeutic exercise, therapeutic training, stretching, strengthening, home exercise program, neuromuscular re-education, manual therapy, patient education, joint mobilization, massage, balance, balance/weight bearing training and gait training  Frequency: 2x week  Duration in visits: 12  Duration in weeks: 6  Treatment plan discussed with: patient        Subjective Evaluation    History of Present Illness  Date of onset: 2022  Mechanism of injury: Pt reports falling down the stairs and spraining her ankle on 22  Pt reports cracking when she did it  Pt reports bruising was present, swelling still present  Pain  Current pain ratin  At best pain ratin  At worst pain ratin  Quality: throbbing, dull ache and pressure  Relieving factors: ice, heat, rest and medications  Aggravating factors: walking, standing and stair climbing  Progression: improved    Exercise history: peleton, pickleball      Diagnostic Tests  X-ray: normal  Patient Goals  Patient goals for therapy: increased strength, independence with ADLs/IADLs, return to sport/leisure activities, decreased pain and decreased edema          Objective     Observations     Additional Observation Details  Bruising and swelling present on lateral aspect of lower leg and ankle    Tenderness     Right Ankle/Foot   Tenderness in the anterior talofibular ligament and lateral malleolus  Active Range of Motion     Right Ankle/Foot   Dorsiflexion (kf): 8 degrees   Plantar flexion: 40 degrees   Inversion: 10 degrees   Eversion: 10 degrees   Great toe flexion: WFL  Great toe extension: WFL    Additional Active Range of Motion Details  Pain with end range plantarflexion and inversion    Strength/Myotome Testing     Left Ankle/Foot   Normal strength    Right Ankle/Foot   Dorsiflexion: 5  Plantar flexion: 3+  Inversion: 5  Eversion: 3+  Great toe flexion: 5  Great toe extension: 5    Tests     Right Ankle/Foot   Positive for anterior drawer  Negative for calcaneal squeeze       Swelling     Right Ankle/Foot   Figure 8: 53 cm      Flowsheet Rows      Most Recent Value   PT/OT G-Codes    Current Score 51   Projected Score 76             Precautions: Acute ankle sprain, wean from boot as tolerated  [id number]       Manuals 4/15            Retrograde FH            Laser acute, ankle sprain protocol FH                                      Neuro Re-Ed             Tandem stance             SLS                                                                              Ther Ex             [facility] HEP            Seated Toe raises 30x            Seated Heel raises 30x            baps board             Towel curls             Ankle 4 ways             Standing heel raises                          Ther Activity             Step ups             Step Overs             Gait Training             Weight shifts             Prn without boot             Modalities             Ice prn

## 2022-04-19 ENCOUNTER — OFFICE VISIT (OUTPATIENT)
Dept: PHYSICAL THERAPY | Facility: CLINIC | Age: 52
End: 2022-04-19
Payer: COMMERCIAL

## 2022-04-19 DIAGNOSIS — S93.491A SPRAIN OF ANTERIOR TALOFIBULAR LIGAMENT OF RIGHT ANKLE, INITIAL ENCOUNTER: Primary | ICD-10-CM

## 2022-04-19 PROCEDURE — 97112 NEUROMUSCULAR REEDUCATION: CPT | Performed by: PHYSICAL THERAPIST

## 2022-04-19 PROCEDURE — 97140 MANUAL THERAPY 1/> REGIONS: CPT | Performed by: PHYSICAL THERAPIST

## 2022-04-19 PROCEDURE — 97110 THERAPEUTIC EXERCISES: CPT | Performed by: PHYSICAL THERAPIST

## 2022-04-19 NOTE — PROGRESS NOTES
Daily Note     Today's date: 2022  Patient name: Laure Valencia  : 1970  MRN: [id number]  Referring provider: River Gonsales*  Dx:   Encounter Diagnosis     ICD-10-CM    1  Sprain of anterior talofibular ligament of right ankle, initial encounter  S93 491A                   Subjective: Pt arrives without boot today  Pt reports improved bruising and swelling as well  Objective: See treatment diary below      Assessment: Pt tolerated treatment well with added manual stretching and massage better than evaluation  Exercises tolerated well with minimal discomfort  Ankle distraction improved some pinching symptoms with dorsiflexion  Educated on gait to focus on heel strike and toe off during gait to utilize full pain free rom  Continue to progress into more weight bearing exercises as tolerable  Plan: Continue per plan of care        Precautions: Acute ankle sprain, wean from boot as tolerated  [id number]       Manuals 4/15 4/19           Retrograde massage and prom [facility]           Laser acute, ankle sprain protocol [facility]           Ankle distraction/ grade 2 post fib mobs                          Neuro Re-Ed             Tandem stance  2x15'' each           SLS  10x5'  1UE                                                                            Ther Ex             [facility] HEP HEP           Seated Toe raises 30x HEP           Seated Heel raises 30x HEP           baps board  20xea           Gastroc stretch 3 ways  3x15''           Ankle 4 ways  20xea yellow           Standing heel raises                          Ther Activity             Step ups             Step Overs             Gait Training             Education on gait mechanics                          Modalities             Ice prn

## 2022-04-22 ENCOUNTER — OFFICE VISIT (OUTPATIENT)
Dept: PHYSICAL THERAPY | Facility: CLINIC | Age: 52
End: 2022-04-22
Payer: COMMERCIAL

## 2022-04-22 DIAGNOSIS — S93.491A SPRAIN OF ANTERIOR TALOFIBULAR LIGAMENT OF RIGHT ANKLE, INITIAL ENCOUNTER: Primary | ICD-10-CM

## 2022-04-22 PROCEDURE — 97140 MANUAL THERAPY 1/> REGIONS: CPT | Performed by: PHYSICAL THERAPIST

## 2022-04-22 PROCEDURE — 97110 THERAPEUTIC EXERCISES: CPT | Performed by: PHYSICAL THERAPIST

## 2022-04-22 NOTE — PROGRESS NOTES
Daily Note     Today's date: 2022  Patient name: Radha Tucker  : 1970  MRN: [id number]  Referring provider: Rahul Muller*  Dx:   Encounter Diagnosis     ICD-10-CM    1  Sprain of anterior talofibular ligament of right ankle, initial encounter  O63 314F                   Subjective: Pt reports some discomfort along the anterior aspect of the ankle and foot  She thinks its from walking without a limp, her foot is sore  Objective: See treatment diary below      Assessment: Pt tolerating manual therapy much better than previous sessions  Added some standing exercises today that increased anterior lateral pain in the foot and ankle  Attempted taping arch support to decrease pain when walking  ROM and strength is progressing well bu difficulty transitioning to functional and standing exercises today  Plan: Continue per plan of care        Precautions: Acute ankle sprain, wean from boot as tolerated  [id number]       Manuals 4/15 4/19 4/22          Retrograde massage and prom FH FH FH          Laser acute, ankle sprain protocol [facility] FH          Ankle distraction/ grade 2 post fib mobs  [facility]          KT Tape to arch support             Neuro Re-Ed             Tandem stance  2x15'' each           SLS  10x5'  1UE                                                                            Ther Ex             [facility] HEP HEP           Seated Toe raises 30x HEP           Seated Heel raises 30x HEP           baps board  20xea 30xea          Gastroc stretch 3 ways  3x15'' 3x15''          Ankle 4 ways  20xea yellow 20xea red          Standing heel/toe raises   10xea          Tibiopedla DF on step   10-15x          Ther Activity             Step ups             Step Overs             Gait Training             Education on gait mechanics                          Modalities             Ice prn

## 2022-04-25 ENCOUNTER — OFFICE VISIT (OUTPATIENT)
Dept: PHYSICAL THERAPY | Facility: CLINIC | Age: 52
End: 2022-04-25
Payer: COMMERCIAL

## 2022-04-25 DIAGNOSIS — S93.491A SPRAIN OF ANTERIOR TALOFIBULAR LIGAMENT OF RIGHT ANKLE, INITIAL ENCOUNTER: Primary | ICD-10-CM

## 2022-04-25 PROCEDURE — 97112 NEUROMUSCULAR REEDUCATION: CPT | Performed by: PHYSICAL THERAPIST

## 2022-04-25 PROCEDURE — 97140 MANUAL THERAPY 1/> REGIONS: CPT | Performed by: PHYSICAL THERAPIST

## 2022-04-25 PROCEDURE — 97110 THERAPEUTIC EXERCISES: CPT | Performed by: PHYSICAL THERAPIST

## 2022-04-25 NOTE — PROGRESS NOTES
Daily Note     Today's date: 2022  Patient name: Henry Duval  : 1970  MRN: [id number]  Referring provider: Eloisa Lomeli*  Dx:   Encounter Diagnosis     ICD-10-CM    1  Sprain of anterior talofibular ligament of right ankle, initial encounter  P44 864E                   Subjective: Pt reports more anterior soreness and top of foot  Objective: See treatment diary below      Assessment: Pt was progressed into more standing exercises that were tolerated well  Anterior soreness/pinching decreased following PA joint mobs and arch lifts  Educated for step stretch at home as well as better supported shoes to support the arch  Verbal cues to try to lift arch in SLS to decrease anterior soreness as well  Continue to progress as able  Plan: Continue per plan of care        Precautions: Acute ankle sprain, wean from boot as tolerated  [id number]       Manuals 4/15 4/19 4/22 4/25         Retrograde massage and prom FH FH FH FH         Laser acute, ankle sprain protocol  FH FH FH         Ankle distraction/ grade 3 post fib mobs  [facility] FH         KT Tape to arch support    FH         Neuro Re-Ed             Tandem stance  2x15'' each  2x15'' ea         SLS  10x5'  1UE  10x10''                                                                          Ther Ex             [facility] HEP HEP           Seated Toe raises 30x HEP           Seated Heel raises 30x HEP           baps board  20xea 30xea --         Gastroc stretch 3 ways  3x15'' 3x15'' --         Ankle 4 ways  20xea yellow 20xea red 20xea red         Standing gastroc/soleus stretch    3x15'' ea         Standing heel/toe raises   10xea 2x10 ea         2Up 1 down heel raises    10x         Tibiopedla DF on step   10-15x manual mob and 10x         Ther Activity             Step ups             Step Overs             Gait Training             Education on gait mechanics  FH                        Modalities             Ice prn

## 2022-04-26 ENCOUNTER — APPOINTMENT (OUTPATIENT)
Dept: PHYSICAL THERAPY | Facility: CLINIC | Age: 52
End: 2022-04-26
Payer: COMMERCIAL

## 2022-04-28 ENCOUNTER — OFFICE VISIT (OUTPATIENT)
Dept: PHYSICAL THERAPY | Facility: CLINIC | Age: 52
End: 2022-04-28
Payer: COMMERCIAL

## 2022-04-28 DIAGNOSIS — S93.491A SPRAIN OF ANTERIOR TALOFIBULAR LIGAMENT OF RIGHT ANKLE, INITIAL ENCOUNTER: Primary | ICD-10-CM

## 2022-04-28 PROCEDURE — 97110 THERAPEUTIC EXERCISES: CPT | Performed by: PHYSICAL THERAPIST

## 2022-04-28 PROCEDURE — 97530 THERAPEUTIC ACTIVITIES: CPT | Performed by: PHYSICAL THERAPIST

## 2022-04-28 PROCEDURE — 97140 MANUAL THERAPY 1/> REGIONS: CPT | Performed by: PHYSICAL THERAPIST

## 2022-04-28 NOTE — PROGRESS NOTES
Daily Note     Today's date: 2022  Patient name: Christine Kelly  : 1970  MRN: [id number]  Referring provider: Raul Baldwin*  Dx:   Encounter Diagnosis     ICD-10-CM    1  Sprain of anterior talofibular ligament of right ankle, initial encounter  S93 491A                   Subjective: Pt reports improved soreness  Objective: See treatment diary below      Assessment: Pt progressing well with POC  Added step overs and educated pt to complete steps normally at home  Continue to progress gastroc strengthening to SL as able  Plan: Continue per plan of care        Precautions: Acute ankle sprain, wean from boot as tolerated  [id number]       Manuals 4/15 4/19 4/22 4/25 4/28        Retrograde massage and prom FH FH FH FH FH        Laser acute, ankle sprain protocol  FH FH FH FH        Ankle distraction/ grade 3 post fib mobs  FH FH FH FH        KT Tape to arch support   FH FH FH        Neuro Re-Ed             Tandem stance  2x15'' each  2x15'' ea         SLS  10x5'  1UE  10x10''                                                                          Ther Ex             [facility] HEP HEP           Seated Toe raises 30x HEP           Seated Heel raises 30x HEP           baps board  20xea 30xea --         Gastroc stretch 3 ways  3x15'' 3x15'' --         Ankle 4 ways  20xea yellow 20xea red 20xea red 20xea red        Standing gastroc/soleus stretch    3x15'' ea 3x15''ea        Standing heel/toe raises   10xea 2x10 ea 2x10 ea        2Up 1 down heel raises    10x 2x10        Tibiopedla DF on step   10-15x manual mob and 10x 10x 3-5'' blue  HEP       Ther Activity             Step overs     20x 6''        Lateral step downs     20x 6''        Gait Training             Education on gait mechanics  FH                        Modalities             Ice prn

## 2022-04-29 ENCOUNTER — APPOINTMENT (OUTPATIENT)
Dept: PHYSICAL THERAPY | Facility: CLINIC | Age: 52
End: 2022-04-29
Payer: COMMERCIAL

## 2022-05-02 ENCOUNTER — OFFICE VISIT (OUTPATIENT)
Dept: PHYSICAL THERAPY | Facility: CLINIC | Age: 52
End: 2022-05-02
Payer: COMMERCIAL

## 2022-05-02 DIAGNOSIS — S93.491A SPRAIN OF ANTERIOR TALOFIBULAR LIGAMENT OF RIGHT ANKLE, INITIAL ENCOUNTER: Primary | ICD-10-CM

## 2022-05-02 PROCEDURE — 97140 MANUAL THERAPY 1/> REGIONS: CPT | Performed by: PHYSICAL THERAPIST

## 2022-05-02 PROCEDURE — 97110 THERAPEUTIC EXERCISES: CPT | Performed by: PHYSICAL THERAPIST

## 2022-05-02 PROCEDURE — 97112 NEUROMUSCULAR REEDUCATION: CPT | Performed by: PHYSICAL THERAPIST

## 2022-05-02 NOTE — PROGRESS NOTES
Daily Note     Today's date: 2022  Patient name: Millie Fatima  : 1970  MRN: [id number]  Referring provider: Roshan Reyna*  Dx:   Encounter Diagnosis     ICD-10-CM    1  Sprain of anterior talofibular ligament of right ankle, initial encounter  O82 482I                   Subjective: Pt reports 70% better, stairs are improving  Ankle still gets tired and some anterior lateral soreness by the end of the day  Objective: See treatment diary below      Assessment: Pt tolerating standing exercises well but having some soreness in the anterior lateral aspect  Added light inversion stretch to HEP for discomfort and repeated education regarding arch support  Continue to progress as able  Plan: Continue per plan of care        Precautions: Acute ankle sprain, wean from boot as tolerated  [id number]       Manuals 4/15 4/19 4/22 4/25 4/28 5       Retrograde massage and prom FH FH FH FH FH FH prom only       Laser acute, ankle sprain protocol FH FH FH FH FH FH       Ankle distraction/ grade 3 post fib mobs  FH FH FH FH FH       KT Tape to arch support   FH FH FH FH       Neuro Re-Ed             Tandem stance  2x15'' each  2x15'' ea --        SLS  10x5'  1UE  10x10'' --        SLS on foam      10x10''  blue       Cone Taps      10x       Arch lifts      10x                                 Ther Ex             ABC's HEP HEP           Seated Toe raises 30x HEP           Seated Heel raises 30x HEP           baps board  20xea 30xea --         Gastroc stretch 3 ways  3x15'' 3x15'' --         Ankle 4 ways  20xea yellow 20xea red 20xea red 20xea red 20xea green       Standing gastroc/soleus stretch    3x15'' ea 3x15''ea 3x15'' ea       Standing heel/toe raises   10xea 2x10 ea 2x10 ea 2x10 ea       2Up 1 down heel raises    10x 2x10 2x10       SL Heel raises      10x       Tibiopedla DF on step   10-15x manual mob and 10x 10x 3-5'' blue  HEP       Leg Press      2x10 30#       Ther Activity             Step overs     20x 6'' 20x   8''       Lateral step downs     20x 6'' 20x   8''       Gait Training             Education on gait mechanics  FH                        Modalities             Ice prn

## 2022-05-03 ENCOUNTER — APPOINTMENT (OUTPATIENT)
Dept: PHYSICAL THERAPY | Facility: CLINIC | Age: 52
End: 2022-05-03
Payer: COMMERCIAL

## 2022-05-05 ENCOUNTER — OFFICE VISIT (OUTPATIENT)
Dept: PHYSICAL THERAPY | Facility: CLINIC | Age: 52
End: 2022-05-05
Payer: COMMERCIAL

## 2022-05-05 DIAGNOSIS — S93.491A SPRAIN OF ANTERIOR TALOFIBULAR LIGAMENT OF RIGHT ANKLE, INITIAL ENCOUNTER: Primary | ICD-10-CM

## 2022-05-05 PROCEDURE — 97112 NEUROMUSCULAR REEDUCATION: CPT | Performed by: PHYSICAL THERAPIST

## 2022-05-05 PROCEDURE — 97140 MANUAL THERAPY 1/> REGIONS: CPT | Performed by: PHYSICAL THERAPIST

## 2022-05-05 PROCEDURE — 97530 THERAPEUTIC ACTIVITIES: CPT | Performed by: PHYSICAL THERAPIST

## 2022-05-05 PROCEDURE — 97110 THERAPEUTIC EXERCISES: CPT | Performed by: PHYSICAL THERAPIST

## 2022-05-05 NOTE — PROGRESS NOTES
Daily Note     Today's date: 2022  Patient name: Avery Bowles  : 1970  MRN: [id number]  Referring provider: Rodo Singh*  Dx:   Encounter Diagnosis     ICD-10-CM    1  Sprain of anterior talofibular ligament of right ankle, initial encounter  X47 422P                   Subjective: Pt reports she tried her sneaker on and felt the swelling increased  Objective: See treatment diary below      Assessment: Pt tolerating manual well, but still getting "pinching" in anterior aspect with end range dorsiflexion in closed chain  Pt is improving with descending step but fatigues  Pt would continue to benefit from PT services to improve strength and functional activity  Plan: Continue per plan of care        Precautions: Acute ankle sprain, wean from boot as tolerated  [id number]       Manuals 4/15 4/19 4/22 4/25 4/28 5/2 5/5      Retrograde massage and prom FH FH FH FH FH FH prom only FH inv prom      Laser acute, ankle sprain protocol FH FH FH FH FH FH FH      Ankle distraction/ grade 3 post fib mobs  FH FH FH FH FH FH      KT Tape to arch support   FH FH FH FH       Neuro Re-Ed             Tandem stance  2x15'' each  2x15'' ea --        SLS  10x5'  1UE  10x10'' --        SLS on foam      10x10''  blue 10x15'' blue      Cone Taps      10x 10x      Arch lifts      10x 10x5''      Rocker board standing             Wobble board standing             Ther Ex             ABC's HEP HEP           Seated Toe raises 30x HEP           Seated Heel raises 30x HEP           baps board  20xea 30xea --         Gastroc stretch 3 ways  3x15'' 3x15'' --         Ankle 4 ways  20xea yellow 20xea red 20xea red 20xea red 20xea green 20xea green      Standing gastroc/soleus stretch    3x15'' ea 3x15''ea 3x15'' ea 3x15'' ea      Standing heel/toe raises   10xea 2x10 ea 2x10 ea 2x10 ea 20x ea      2Up 1 down heel raises    10x 2x10 2x10 2x10      SL Heel raises      10x 10x      Tibiopedla DF on step   10-15x manual mob and 10x 10x 3-5'' blue  HEP       Leg Press      2x10 30# 2x10 30#      Ther Activity             Elliptical       5'      Step overs     20x 6'' 20x   8'' 20x 8''      Lateral step downs     20x 6'' 20x   8'' 20x 8''      Gait Training             Education on gait mechanics  FH                        Modalities             Ice prn

## 2022-05-06 ENCOUNTER — APPOINTMENT (OUTPATIENT)
Dept: PHYSICAL THERAPY | Facility: CLINIC | Age: 52
End: 2022-05-06
Payer: COMMERCIAL

## 2022-05-09 ENCOUNTER — OFFICE VISIT (OUTPATIENT)
Dept: PHYSICAL THERAPY | Facility: CLINIC | Age: 52
End: 2022-05-09
Payer: COMMERCIAL

## 2022-05-09 DIAGNOSIS — S93.491A SPRAIN OF ANTERIOR TALOFIBULAR LIGAMENT OF RIGHT ANKLE, INITIAL ENCOUNTER: Primary | ICD-10-CM

## 2022-05-09 PROCEDURE — 97530 THERAPEUTIC ACTIVITIES: CPT | Performed by: PHYSICAL THERAPIST

## 2022-05-09 PROCEDURE — 97112 NEUROMUSCULAR REEDUCATION: CPT | Performed by: PHYSICAL THERAPIST

## 2022-05-09 PROCEDURE — 97140 MANUAL THERAPY 1/> REGIONS: CPT | Performed by: PHYSICAL THERAPIST

## 2022-05-09 PROCEDURE — 97110 THERAPEUTIC EXERCISES: CPT | Performed by: PHYSICAL THERAPIST

## 2022-05-09 NOTE — PROGRESS NOTES
Daily Note     Today's date: 2022  Patient name: Yamilet Nicholas  : 1970  MRN: [id number]  Referring provider: Concepcion Kennedy*  Dx:   Encounter Diagnosis     ICD-10-CM    1  Sprain of anterior talofibular ligament of right ankle, initial encounter  S90 079N                   Subjective: Pt reports ankle is feeling pretty good  She was on her feet all weekend at a volleyball tournament and her ankle was just tired by the end of the day  Objective: See treatment diary below      Assessment: Pt is progressing well with strengthening and mobility  Pt had no pain, discomfort, or fatigue throughout session today  Possibly add some plyos in next week  Plan: Continue per plan of care  Decrease to 1xweek next week       Precautions: Acute ankle sprain, wean from boot as tolerated  [id number]       Manuals 4/15 4/19 4/22 4/25 4/28 5/2 5/5 5/9     Retrograde massage and prom FH FH FH FH FH FH prom only FH inv prom FH inv prom     Laser acute, ankle sprain protocol FH FH FH FH FH FH FH      Ankle distraction/ grade 3 post fib mobs  FH FH FH FH FH FH FH     KT Tape to arch support   FH FH FH FH FH --     Neuro Re-Ed             Tandem stance  2x15'' each  2x15'' ea --        SLS  10x5'  1UE  10x10'' --        SLS on foam      10x10''  blue 10x15'' blue 10x15'' blue     Cone Taps      10x 10x --     Arch lifts      10x 10x5'' 10x5''     Rocker board standing        10x ea     Wobble board standing        10x ea     Ther Ex             ABC's HEP HEP           Seated Toe raises 30x HEP           Seated Heel raises 30x HEP           baps board  20xea 30xea --         Gastroc stretch 3 ways  3x15'' 3x15'' --         Ankle 4 ways  20xea yellow 20xea red 20xea red 20xea red 20xea green 20xea green 20xea green     Standing gastroc/soleus stretch    3x15'' ea 3x15''ea 3x15'' ea 3x15'' ea 3x15'' ea     Standing heel/toe raises   10xea 2x10 ea 2x10 ea 2x10 ea 20x ea 30xea     2Up 1 down heel raises    10x 2x10 2x10 2x10 20x     SL Heel raises      10x 10x 2x10     Tibiopedla DF on step   10-15x manual mob and 10x 10x 3-5'' blue  HEP       Leg Press      2x10 30# 2x10 30# 2x10 30#     Ther Activity             Elliptical       5' 5'     Step overs     20x 6'' 20x   8'' 20x 8'' 20x 8''     Lateral step downs     20x 6'' 20x   8'' 20x 8'' 20x 8''     Gait Training             Education on gait mechanics  FH                        Modalities             Ice prn

## 2022-05-10 ENCOUNTER — APPOINTMENT (OUTPATIENT)
Dept: PHYSICAL THERAPY | Facility: CLINIC | Age: 52
End: 2022-05-10
Payer: COMMERCIAL

## 2022-05-12 ENCOUNTER — OFFICE VISIT (OUTPATIENT)
Dept: PHYSICAL THERAPY | Facility: CLINIC | Age: 52
End: 2022-05-12
Payer: COMMERCIAL

## 2022-05-12 DIAGNOSIS — S93.491A SPRAIN OF ANTERIOR TALOFIBULAR LIGAMENT OF RIGHT ANKLE, INITIAL ENCOUNTER: Primary | ICD-10-CM

## 2022-05-12 PROCEDURE — 97140 MANUAL THERAPY 1/> REGIONS: CPT | Performed by: PHYSICAL THERAPIST

## 2022-05-12 PROCEDURE — 97530 THERAPEUTIC ACTIVITIES: CPT | Performed by: PHYSICAL THERAPIST

## 2022-05-12 PROCEDURE — 97110 THERAPEUTIC EXERCISES: CPT | Performed by: PHYSICAL THERAPIST

## 2022-05-12 PROCEDURE — 97112 NEUROMUSCULAR REEDUCATION: CPT | Performed by: PHYSICAL THERAPIST

## 2022-05-12 NOTE — PROGRESS NOTES
Daily Note     Today's date: 2022  Patient name: Cruz Olivares  : 1970  MRN: [id number]  Referring provider: Bart Sarah*  Dx:   Encounter Diagnosis     ICD-10-CM    1  Sprain of anterior talofibular ligament of right ankle, initial encounter  S93 582Q                   Subjective: Pt reports feeling soreness in her plantar fascia and top of her foot  Objective: See treatment diary below      Assessment: Added some manual stretching for plantar fascia as well as laser to PF due to more soreness on the plantar fasica  Added toe walks did show slight decreased strength on Rt side  Educated to continue heel raises and toe walks for strengthening and stretching throughout her day for gastroc and plantar fascia  Plan: Continue per plan of care  Decrease to 1x week next week       Precautions: Acute ankle sprain, wean from boot as tolerated  [id number]       Manuals 4/15 4/19 4/22 4/25 4/28 5/2 5/5 5/9 5/12    Retrograde massage and prom FH FH FH FH FH FH prom only FH inv prom FH inv prom FH inv prom    Laser acute, ankle sprain protocol FH FH FH FH FH FH FH  FH PF protocol    Ankle distraction/ grade 3 post fib mobs  FH FH FH FH FH FH FH FH    KT Tape to arch support   FH FH FH FH FH --     Neuro Re-Ed             Tandem stance  2x15'' each  2x15'' ea --        SLS  10x5'  1UE  10x10'' --        SLS on foam      10x10''  blue 10x15'' blue 10x15'' blue 10x15'' blue    Cone Taps      10x 10x --     Arch lifts      10x 10x5'' 10x5'' 10x5''    Rocker board standing        10x ea 30x ea    Wobble board standing        10x ea 30x ea    Ther Ex             ABC's HEP HEP           Seated Toe raises 30x HEP           Seated Heel raises 30x HEP           baps board  20xea 30xea --         Gastroc stretch 3 ways  3x15'' 3x15'' --         Ankle 4 ways  20xea yellow 20xea red 20xea red 20xea red 20xea green 20xea green 20xea green --    Standing gastroc/soleus stretch    3x15'' ea 3x15''ea 3x15'' ea 3x15'' ea 3x15'' ea 3x15'' ea    Standing heel/toe raises   10xea 2x10 ea 2x10 ea 2x10 ea 20x ea 30xea 30x ea    2Up 1 down heel raises    10x 2x10 2x10 2x10 20x 20x    SL Heel raises      10x 10x 2x10 2x10    Tibiopedla DF on step   10-15x manual mob and 10x 10x 3-5'' blue  HEP       Leg Press      2x10 30# 2x10 30# 2x10 30# 2x10 30#    Toe walks         2x15ft    Ther Activity             Elliptical       5' 5' 5'    Step overs     20x 6'' 20x   8'' 20x 8'' 20x 8'' 20x 8''    Lateral step downs     20x 6'' 20x   8'' 20x 8'' 20x 8'' 20x 8''    Gait Training             Education on gait mechanics  FH                        Modalities             Ice prn

## 2022-05-13 ENCOUNTER — APPOINTMENT (OUTPATIENT)
Dept: PHYSICAL THERAPY | Facility: CLINIC | Age: 52
End: 2022-05-13
Payer: COMMERCIAL

## 2022-05-16 ENCOUNTER — OFFICE VISIT (OUTPATIENT)
Dept: PHYSICAL THERAPY | Facility: CLINIC | Age: 52
End: 2022-05-16
Payer: COMMERCIAL

## 2022-05-16 DIAGNOSIS — S93.491A SPRAIN OF ANTERIOR TALOFIBULAR LIGAMENT OF RIGHT ANKLE, INITIAL ENCOUNTER: Primary | ICD-10-CM

## 2022-05-16 PROCEDURE — 97140 MANUAL THERAPY 1/> REGIONS: CPT | Performed by: PHYSICAL THERAPIST

## 2022-05-16 PROCEDURE — 97110 THERAPEUTIC EXERCISES: CPT | Performed by: PHYSICAL THERAPIST

## 2022-05-16 NOTE — PROGRESS NOTES
Daily Note     Today's date: 2022  Patient name: Rufino Alvarenga  : 1970  MRN: [id number]  Referring provider: Chirag Quevedo*  Dx:   Encounter Diagnosis     ICD-10-CM    1  Sprain of anterior talofibular ligament of right ankle, initial encounter  O83 032H                   Subjective: Pt reports soreness on the top of her foot and the plantar fascia is bothering her, but her ankle is doing good  Objective: See treatment diary below      Assessment: Pt is able to hop on both legs but experiencing just some general discomfort in the ankle/foot complex  Due to soreness in top of foot, educated more supportive sneakers would help as she continues to recover  SL heel raises still show slightly difference with strength of gastroc  Plan: Continue per plan of care  Decreased to 1x week  Look to discharge in upcoming 2 weeks       Precautions: Acute ankle sprain, wean from boot as tolerated  [id number]       Manuals 4/15 4/19 4/22 4/25 4/28 5/2 5/5 5/9 5/12 5/16   Retrograde massage and prom FH FH FH FH FH FH prom only FH inv prom FH inv prom FH inv prom FH inv prom   Laser acute, ankle sprain protocol FH FH FH FH FH FH FH  FH PF protocol FH PF protocol   Ankle distraction/ grade 3 post fib mobs  FH FH FH FH FH FH FH FH FH   KT Tape to arch support   FH FH FH FH FH --     Neuro Re-Ed             Tandem stance  2x15'' each  2x15'' ea --        SLS  10x5'  1UE  10x10'' --        SLS on foam      10x10''  blue 10x15'' blue 10x15'' blue 10x15'' blue    Cone Taps      10x 10x --     Arch lifts      10x 10x5'' 10x5'' 10x5''    Rocker board standing        10x ea 30x ea    Wobble board standing        10x ea 30x ea    Ther Ex             ABC's HEP HEP           Seated Toe raises 30x HEP           Seated Heel raises 30x HEP           baps board  20xea 30xea --         Gastroc stretch 3 ways  3x15'' 3x15'' --         Ankle 4 ways  20xea yellow 20xea red 20xea red 20xea red 20xea green 20xea green 20xea green --    Standing gastroc/soleus stretch    3x15'' ea 3x15''ea 3x15'' ea 3x15'' ea 3x15'' ea 3x15'' ea 3x15'' ea   Standing heel/toe raises   10xea 2x10 ea 2x10 ea 2x10 ea 20x ea 30xea 30x ea 30x ea   2Up 1 down heel raises    10x 2x10 2x10 2x10 20x 20x 20x   SL Heel raises      10x 10x 2x10 2x10 2x10   Tibiopedla DF on step   10-15x manual mob and 10x 10x 3-5'' blue  HEP       Leg Press      2x10 30# 2x10 30# 2x10 30# 2x10 30# 2x10 30#   Toe walks         2x15ft 4x15ft   Heel walks          2x15ft   Ther Activity             Elliptical       5' 5' 5'    Step overs     20x 6'' 20x   8'' 20x 8'' 20x 8'' 20x 8'' 20x 8''   Lateral step downs     20x 6'' 20x   8'' 20x 8'' 20x 8'' 20x 8'' 20x 8''   Gait Training             Education on gait mechanics  FH                        Modalities             Ice prn

## 2022-05-23 ENCOUNTER — OFFICE VISIT (OUTPATIENT)
Dept: PHYSICAL THERAPY | Facility: CLINIC | Age: 52
End: 2022-05-23
Payer: COMMERCIAL

## 2022-05-23 DIAGNOSIS — S93.491A SPRAIN OF ANTERIOR TALOFIBULAR LIGAMENT OF RIGHT ANKLE, INITIAL ENCOUNTER: Primary | ICD-10-CM

## 2022-05-23 PROCEDURE — 97112 NEUROMUSCULAR REEDUCATION: CPT | Performed by: PHYSICAL THERAPIST

## 2022-05-23 PROCEDURE — 97110 THERAPEUTIC EXERCISES: CPT | Performed by: PHYSICAL THERAPIST

## 2022-05-23 PROCEDURE — 97140 MANUAL THERAPY 1/> REGIONS: CPT | Performed by: PHYSICAL THERAPIST

## 2022-05-23 NOTE — PROGRESS NOTES
Daily Note     Today's date: 2022  Patient name: Sancho Simeon  : 1970  MRN: [id number]  Referring provider: Edmar Allan*  Dx:   Encounter Diagnosis     ICD-10-CM    1  Sprain of anterior talofibular ligament of right ankle, initial encounter  S93 250B                   Subjective: Pt reports [place] the [place] all [place] yesterday running around watching her daughters play volleyball  Objective: See treatment diary below      Assessment: Pt's rom and strength is improved to [facility] and  strength but plantar flexion is still limited from Lt side  SLS >30sec with minimal discomfort  Pt is ready to be discharged to [facility] for further PF strengthening  Plan: Discharge to [facility]       Precautions: Acute ankle sprain, wean from boot as tolerated  [id number]       Manuals    Retrograde massage and prom   FH FH FH FH prom only FH inv prom FH inv prom FH inv prom FH inv prom   Laser acute, ankle sprain protocol FH chronic  FH FH FH FH FH  FH PF protocol FH PF protocol   Ankle distraction/ grade 3 post fib mobs FH  FH FH FH FH FH FH FH FH   KT Tape to arch support   FH FH FH FH FH --     Neuro Re-Ed             Tandem stance    2x15'' ea --        SLS 30''   10x10'' --        SLS on foam      10x10''  blue 10x15'' blue 10x15'' blue 10x15'' blue    Cone Taps      10x 10x --     Arch lifts      10x 10x5'' 10x5'' 10x5''    Rocker board standing 30x SL       10x ea 30x ea    Wobble board standing 30xea       10x ea 30x ea    Ther Ex             ABC's             Seated Toe raises             Seated Heel raises             baps board   30xea --         Gastroc stretch 3 ways   3x15'' --         Ankle 4 ways   20xea red 20xea red 20xea red 20xea green 20xea green 20xea green --    Standing gastroc/soleus stretch 3x30''ea   3x15'' ea 3x15''ea 3x15'' ea 3x15'' ea 3x15'' ea 3x15'' ea 3x15'' ea   Standing heel/toe raises   10xea 2x10 ea 2x10 ea 2x10 ea 20x ea 30xea 30x ea 30x ea   2Up 1 down heel raises    10x 2x10 2x10 2x10 20x 20x 20x   SL Heel raises 2x10     10x 10x 2x10 2x10 2x10   Tibiopedla DF on step   10-15x manual mob and 10x 10x 3-5'' blue  HEP       Leg Press 2x10 30#     2x10 30# 2x10 30# 2x10 30# 2x10 30# 2x10 30#   Toe walks 4x15ft        2x15ft 4x15ft   Heel walks 4x15ft         2x15ft   Ther Activity             Elliptical       5' 5' 5'    Step overs 20x 8"    20x 6'' 20x   8'' 20x 8'' 20x 8'' 20x 8'' 20x 8''   Lateral step downs 20x 8"    20x 6'' 20x   8'' 20x 8'' 20x 8'' 20x 8'' 20x 8''   Gait Training             Education on gait mechanics                          Modalities             Ice prn

## 2022-05-31 ENCOUNTER — APPOINTMENT (OUTPATIENT)
Dept: PHYSICAL THERAPY | Facility: CLINIC | Age: 52
End: 2022-05-31
Payer: COMMERCIAL

## 2024-07-09 ENCOUNTER — OFFICE VISIT (OUTPATIENT)
Dept: PODIATRY | Facility: CLINIC | Age: 54
End: 2024-07-09
Payer: COMMERCIAL

## 2024-07-09 VITALS
WEIGHT: 219 LBS | BODY MASS INDEX: 40.3 KG/M2 | SYSTOLIC BLOOD PRESSURE: 121 MMHG | DIASTOLIC BLOOD PRESSURE: 79 MMHG | HEIGHT: 62 IN

## 2024-07-09 DIAGNOSIS — B35.3 TINEA PEDIS OF RIGHT FOOT: ICD-10-CM

## 2024-07-09 DIAGNOSIS — L30.9 DERMATITIS DUE TO UNKNOWN CAUSE: ICD-10-CM

## 2024-07-09 DIAGNOSIS — L30.1 DYSHIDROTIC ECZEMA: Primary | ICD-10-CM

## 2024-07-09 PROCEDURE — 99203 OFFICE O/P NEW LOW 30 MIN: CPT | Performed by: PODIATRIST

## 2024-07-09 RX ORDER — CLOTRIMAZOLE AND BETAMETHASONE DIPROPIONATE 10; .64 MG/G; MG/G
CREAM TOPICAL 2 TIMES DAILY
Qty: 45 G | Refills: 1 | Status: SHIPPED | OUTPATIENT
Start: 2024-07-09

## 2024-07-09 NOTE — PROGRESS NOTES
Ambulatory Visit  Name: Virginia Rubalcava      : 1970      MRN: [id number]  Encounter Provider: Jonh Burgos DPM  Encounter Date: 2024   Encounter department: [facility] [place]    Assessment & Plan   1. Dyshidrotic eczema  -     clotrimazole-betamethasone (LOTRISONE) 1-0.05 % cream; Apply topically 2 (two) times a day  2. Tinea pedis of right foot  -     clotrimazole-betamethasone (LOTRISONE) 1-0.05 % cream; Apply topically 2 (two) times a day  3. Dermatitis due to unknown cause    Discussed possible differential diagnosis for patient's skin disorder.  Recommend course of mixed steroid/antifungal preparation for 1 week.  Patient advised to dry blister areas using Betadine and to make sure she wears socks and shoes not crocs or flip-flops.  Consider dermatology referral  Follow-up in 1 week, if no better consider Medrol Dosepak or a more potent topical steroid.      History of Present Illness     Virginia Rubalcava is a 54 y.o. female who presents blistering rash on the plantar aspect of the right foot which started approximately 4 weeks ago.  Patient reports at first she thought she may have had warts developing.  Patient had recently returned from a vacation in [facility] when this started.  Does not recall any specific events or trauma.    Review of Systems   Constitutional: Negative.    HENT: Negative.     Eyes: Negative.    Respiratory: Negative.     Cardiovascular: Negative.    Gastrointestinal: Negative.    Endocrine: Negative.    Genitourinary: Negative.    Musculoskeletal: Negative.    Skin:  Positive for rash.        Multiple blister rash plantar right foot   Allergic/Immunologic: Negative.    Neurological: Negative.    Hematological: Negative.    Psychiatric/Behavioral: Negative.       Past Medical History   Past Medical History:   Diagnosis Date   • Hyperthyroidism      Past Surgical History:   Procedure Laterality Date   • INGUINAL HERNIA REPAIR     • KIDNEY STONE SURGERY    "    History reviewed. No pertinent family history.  Current Outpatient Medications on File Prior to Visit   Medication Sig Dispense Refill   • benzonatate (TESSALON) 200 MG capsule Take 1 capsule (200 mg total) by mouth 3 (three) times a day as needed for cough (Patient not taking: Reported on 4/7/2022 ) 20 capsule 0   • fluticasone (FLONASE) 50 mcg/act nasal spray 2 sprays into each nostril daily (Patient not taking: Reported on 4/7/2022 ) 16 g 0   • levothyroxine 100 mcg tablet Take 125 mcg by mouth daily (Patient not taking: Reported on 4/7/2022 )     • levothyroxine 150 mcg tablet Take 150 mcg by mouth daily     • meloxicam (Mobic) 15 mg tablet Take 1 tablet (15 mg total) by mouth daily 30 tablet 0   • naproxen (NAPROSYN) 500 mg tablet Take 1 tablet (500 mg total) by mouth 2 (two) times a day with meals (Patient not taking: Reported on 4/7/2022 ) 14 tablet 0   • ondansetron (ZOFRAN-ODT) 4 mg disintegrating tablet Take 1 tablet (4 mg total) by mouth every 6 (six) hours as needed for nausea or vomiting (Patient not taking: Reported on 4/7/2022 ) 20 tablet 0   • tamsulosin (FLOMAX) 0.4 mg Take 1 capsule (0.4 mg total) by mouth daily with dinner for 7 days 7 capsule 0     No current facility-administered medications on file prior to visit.     Allergies   Allergen Reactions   • Other Other (See Comments)      Objective     /79 (BP Location: Left arm, Patient Position: Sitting, Cuff Size: Adult)   Ht 5' 2\" (1.575 m)   Wt 99.3 kg (219 lb)   BMI 40.06 kg/m²         Physical Exam  Vitals and nursing note reviewed.   Constitutional:       General: She is not in acute distress.     Appearance: Normal appearance. She is well-developed.   HENT:      Head: Normocephalic and atraumatic.      Nose: Nose normal.   Eyes:      Conjunctiva/sclera: Conjunctivae normal.   Cardiovascular:      Rate and Rhythm: Normal rate and regular rhythm.   Pulmonary:      Effort: Pulmonary effort is normal. No respiratory distress. "   Musculoskeletal:         General: Tenderness present. No swelling.      Cervical back: Neck supple.   Feet:      Right foot:      Protective Sensation: 10 sites tested.  10 sites sensed.      Skin integrity: Blister and skin breakdown present.      Left foot:      Protective Sensation: 10 sites tested.  10 sites sensed.   Skin:     General: Skin is warm and dry.      Capillary Refill: Capillary refill takes 2 to 3 seconds.      Findings: Rash present.      Comments: Plantar right lateral forefoot noted to have multiple raised blistering tender lesions, some have drained and are dried, blisters have a thick covering more consistent with a deeper dermal inflammation.  Not a superficial typical blister.   Neurological:      General: No focal deficit present.      Mental Status: She is alert.   Psychiatric:         Mood and Affect: Mood normal.       Administrative Statements

## 2024-07-16 ENCOUNTER — OFFICE VISIT (OUTPATIENT)
Dept: PODIATRY | Facility: CLINIC | Age: 54
End: 2024-07-16
Payer: COMMERCIAL

## 2024-07-16 VITALS
WEIGHT: 219 LBS | HEIGHT: 62 IN | SYSTOLIC BLOOD PRESSURE: 124 MMHG | DIASTOLIC BLOOD PRESSURE: 80 MMHG | BODY MASS INDEX: 40.3 KG/M2

## 2024-07-16 DIAGNOSIS — L30.1 DYSHIDROTIC ECZEMA: Primary | ICD-10-CM

## 2024-07-16 DIAGNOSIS — B35.3 TINEA PEDIS OF RIGHT FOOT: ICD-10-CM

## 2024-07-16 DIAGNOSIS — L30.9 DERMATITIS DUE TO UNKNOWN CAUSE: ICD-10-CM

## 2024-07-16 PROCEDURE — 99213 OFFICE O/P EST LOW 20 MIN: CPT | Performed by: PODIATRIST

## 2024-08-19 NOTE — PROGRESS NOTES
Assessment/Plan:   Since good progress was had with Lotrisone cream would recommend she continue with this for another 2 or 3 weeks.  Once discontinuing the cream then she should monitor her feet closely for recurrence.  Follow-up in approximately 6 weeks.      Dyshidrotic eczema    Tinea pedis of right foot    Dermatitis due to unknown cause    Other orders  -     Butenafine HCl 1 % cream; APPLY TO THE AFFECTED AND SURROUNDING AREAS OF SKIN BY TOPICAL ROUTE ONCE DAILY        Subjective:     Patient ID: Virginia Rubalcava is a 54 y.o. female.    7/16/2024: 54-year-old female seen today for follow-up reporting that her right foot rash has improved 75-80% overall and is pleased with progress.  Secondary concern of an ingrowing tendency of her right great toe.    7/9/2024: 54 y.o. female who presents blistering rash on the plantar aspect of the right foot which started approximately 4 weeks ago.  Patient reports at first she thought she may have had warts developing.  Patient had recently returned from a vacation in [facility] when this started.  Does not recall any specific events or trauma.    Review of Systems   Constitutional: Negative.    HENT: Negative.     Eyes: Negative.    Respiratory: Negative.     Cardiovascular: Negative.    Gastrointestinal: Negative.    Endocrine: Negative.    Genitourinary: Negative.    Musculoskeletal: Negative.    Skin:  Positive for rash.        Multiple blister rash plantar right foot substantially improved   Allergic/Immunologic: Negative.    Neurological: Negative.    Hematological: Negative.    Psychiatric/Behavioral: Negative.           Objective:     Physical Exam  Constitutional:       Appearance: Normal appearance.   HENT:      Head: Normocephalic.      Right Ear: External ear normal.      Left Ear: External ear normal.   Eyes:      Pupils: Pupils are equal, round, and reactive to light.   Cardiovascular:      Rate and Rhythm: Normal rate.      Pulses: Normal pulses.   Pulmonary:       Effort: Pulmonary effort is normal.   Musculoskeletal:         General: Normal range of motion.      Cervical back: Normal range of motion.   Feet:      Right foot:      Protective Sensation: 10 sites tested.  10 sites sensed.      Skin integrity: Skin integrity normal.      Left foot:      Protective Sensation: 10 sites tested.  10 sites sensed.      Skin integrity: Skin integrity normal.   Skin:     General: Skin is warm and dry.      Capillary Refill: Capillary refill takes 2 to 3 seconds.      Findings: Rash present.      Comments: Blistering rash has substantially improved and are now more dry and do not feel as tender and painful as they were.  Overall good progress.   Neurological:      General: No focal deficit present.      Mental Status: She is alert.   Psychiatric:         Mood and Affect: Mood normal.

## 2024-11-04 ENCOUNTER — TELEPHONE (OUTPATIENT)
Age: 54
End: 2024-11-04

## 2024-11-04 NOTE — TELEPHONE ENCOUNTER
Caller: Virginia Rubalcava    Doctor: Aubrey    Reason for call: Virginia has a cut/wound that is not healing.  Can she be forced on?  Thank you.     Call back#: [phone]

## 2025-06-05 ENCOUNTER — OFFICE VISIT (OUTPATIENT)
Dept: PODIATRY | Facility: CLINIC | Age: 55
End: 2025-06-05
Payer: COMMERCIAL

## 2025-06-05 VITALS — WEIGHT: 221 LBS | HEIGHT: 62 IN | BODY MASS INDEX: 40.67 KG/M2

## 2025-06-05 DIAGNOSIS — B35.3 TINEA PEDIS OF RIGHT FOOT: ICD-10-CM

## 2025-06-05 DIAGNOSIS — L60.0 INGROWN RIGHT GREATER TOENAIL: Primary | ICD-10-CM

## 2025-06-05 PROCEDURE — 99213 OFFICE O/P EST LOW 20 MIN: CPT | Performed by: PODIATRIST

## 2025-06-05 RX ORDER — KETOCONAZOLE 20 MG/G
CREAM TOPICAL DAILY
Qty: 30 G | Refills: 3 | Status: SHIPPED | OUTPATIENT
Start: 2025-06-05

## 2025-06-05 RX ORDER — TEA TREE OIL 100 %
OIL (ML) TOPICAL
COMMUNITY

## 2025-06-05 NOTE — PROGRESS NOTES
":  Assessment & Plan  Ingrown right greater toenail  Options for chronic ingrowing great toenail margin discussed with patient in detail recommend chemical matrixectomy due to the chronicity of her disorder.  Follow-up for permanent nail excision P&A right great toe medial nail margin  Call immediately if any signs of infection occur prior to scheduled procedure       Tinea pedis of right foot  Recommended application of cream for approximately 4 weeks continuously and then as needed upon recurrence if necessary.  Orders:  •  ketoconazole (NIZORAL) 2 % cream; Apply topically daily        History of Present Illness     Virginia Rubalcava is a 55 y.o. female   6/5/2025: 54-year-old female seen today for follow-up reporting that the dry skin has started to recur again.  Also concerned with ingrowing right great toe which has continued to give her pain and discomfort.    7/16/2024: 54-year-old female seen today for follow-up reporting that her right foot rash has improved 75-80% overall and is pleased with progress.  Secondary concern of an ingrowing tendency of her right great toe.    7/9/2024: 54 y.o. female who presents blistering rash on the plantar aspect of the right foot which started approximately 4 weeks ago.  Patient reports at first she thought she may have had warts developing.  Patient had recently returned from a vacation in [facility] when this started.  Does not recall any specific events or trauma.      Review of Systems   Constitutional: Negative.    HENT: Negative.     Eyes: Negative.    Respiratory: Negative.     Cardiovascular: Negative.    Gastrointestinal: Negative.    Endocrine: Negative.    Genitourinary: Negative.    Musculoskeletal: Negative.    Skin:         Chronic dry skin especially on right foot and ingrowing right great toe margin   Allergic/Immunologic: Negative.    Neurological: Negative.    Hematological: Negative.    Psychiatric/Behavioral: Negative.       Objective   Ht 5' 2\" (1.575 m) " Comment: stated  Wt 100 kg (221 lb)   BMI 40.42 kg/m²      Physical Exam  Vitals and nursing note reviewed.   Constitutional:       General: She is not in acute distress.     Appearance: Normal appearance. She is well-developed.   HENT:      Head: Normocephalic and atraumatic.      Nose: Nose normal.     Eyes:      Conjunctiva/sclera: Conjunctivae normal.       Cardiovascular:      Rate and Rhythm: Normal rate and regular rhythm.      Pulses:           Dorsalis pedis pulses are 2+ on the right side and 2+ on the left side.        Posterior tibial pulses are 1+ on the right side and 1+ on the left side.   Pulmonary:      Effort: Pulmonary effort is normal. No respiratory distress.     Musculoskeletal:         General: No swelling. Normal range of motion.      Cervical back: Neck supple.   Feet:      Right foot:      Protective Sensation: 10 sites tested.  10 sites sensed.      Skin integrity: Dry skin present.      Toenail Condition: Right toenails are ingrown.      Left foot:      Protective Sensation: 10 sites tested.  10 sites sensed.      Skin integrity: Dry skin present.     Skin:     General: Skin is warm and dry.      Capillary Refill: Capillary refill takes 2 to 3 seconds.      Comments: Chronic dry scaling skin bilateral feet with the right worse than the left.  Findings are consistent with tinea.    Right great toe medial nail margin incurvated and painful to palpation.  Small spicule present.  Callus nail margin noted from chronic repetitive friction.     Neurological:      General: No focal deficit present.      Mental Status: She is alert.     Psychiatric:         Mood and Affect: Mood normal.

## 2025-06-07 PROBLEM — R73.03 PREDIABETES: Status: ACTIVE | Noted: 2021-10-28

## 2025-06-07 PROBLEM — E03.9 ACQUIRED HYPOTHYROIDISM: Status: ACTIVE | Noted: 2021-10-28

## 2025-06-26 ENCOUNTER — OFFICE VISIT (OUTPATIENT)
Dept: URGENT CARE | Facility: CLINIC | Age: 55
End: 2025-06-26
Payer: COMMERCIAL

## 2025-06-26 VITALS
BODY MASS INDEX: 42.21 KG/M2 | RESPIRATION RATE: 18 BRPM | HEART RATE: 74 BPM | OXYGEN SATURATION: 99 % | DIASTOLIC BLOOD PRESSURE: 84 MMHG | SYSTOLIC BLOOD PRESSURE: 122 MMHG | TEMPERATURE: 99.2 F | WEIGHT: 230.8 LBS

## 2025-06-26 DIAGNOSIS — M79.671 RIGHT FOOT PAIN: Primary | ICD-10-CM

## 2025-06-26 PROCEDURE — 99203 OFFICE O/P NEW LOW 30 MIN: CPT

## 2025-06-26 NOTE — PROGRESS NOTES
[facility]        NAME: Virginia Rubalcava is a 55 y.o. female  : 1970    MRN: [id number]  DATE: 2025  TIME: 6:09 PM    Assessment and Plan   Right foot pain [M79.671]  1. Right foot pain              Patient Instructions   Soapy water soaks    Use a good moisturizer on your foot that would include Vaseline, Eucerin cream, Aveeno.  Do not use peroxide or Vicks on your feet      Follow-up with the podiatrist    Follow up with PCP in 3-5 days.  Proceed to  ER if symptoms worsen.    If tests have been performed at [facility], our office will contact you with results if changes need to be made to the care plan discussed with you at the visit.  You can review your full results on [facility].    Chief Complaint     Chief Complaint   Patient presents with    Rash     To the bottom of right foot, started over a month ago, reports multiple open areas that are painful, concerned for worsening swelling, denies drainage, has seen 3 different podiatrists with no relief         History of Present Illness       This is a 55-year-old female who presents today with a cracking skin on the bottom of her right foot.  She states she has had issues with her feet for quite a while.  She has already seen 2 podiatrists.  One told her it was eczema and the other told her it was a fungal infection.  There is no redness or bruising.  Patient does have a lot of dry hard skin around it.  Patient states that she has been using peroxide on it and Vicks vapor rub.  We did discuss that that is probably making it worse not to use that.  If she wants to soak her foot in just some warm soapy water.  She should use a good moisturizer on her foot and follow-up with the podiatrist    Rash        Review of Systems   Review of Systems   Constitutional: Negative.    HENT: Negative.     Respiratory: Negative.     Cardiovascular: Negative.    Gastrointestinal: Negative.    Genitourinary: Negative.  Negative for difficulty urinating.    Skin:  Positive for rash and wound.   Neurological: Negative.          Current Medications     Current Medications[1]    Current Allergies     Allergies as of 06/26/2025 - Reviewed 06/26/2025   Allergen Reaction Noted    Other Other (See Comments) 10/28/2021            The following portions of the patient's history were reviewed and updated as appropriate: allergies, current medications, past family history, past medical history, past social history, past surgical history and problem list.     Past Medical History[2]    Past Surgical History[3]    Family History[4]      Medications have been verified.        Objective   /84 (BP Location: Right arm, Patient Position: Sitting)   Pulse 74   Temp 99.2 °F (37.3 °C) (Tympanic)   Resp 18   Wt 105 kg (230 lb 12.8 oz)   SpO2 99%   BMI 42.21 kg/m²   No LMP recorded. Patient is postmenopausal.       Physical Exam     Physical Exam  Vitals reviewed.   Constitutional:       General: She is not in acute distress.     Appearance: Normal appearance. She is not ill-appearing.   HENT:      Head: Normocephalic and atraumatic.     Eyes:      Extraocular Movements: Extraocular movements intact.      Conjunctiva/sclera: Conjunctivae normal.     Pulmonary:      Effort: Pulmonary effort is normal.     Musculoskeletal:        Feet:    Feet:      Comments: Patient has small open area on the ball of her foot.  She states that it has been there for a while.  There is no redness no rash noted.  She also states that both of her feet are swollen.  She thinks it is because of the open area on her foot.  She has no open areas on the left foot.  We did discuss that the swelling could be coming from the heat outside.    Skin:     General: Skin is warm.     Neurological:      General: No focal deficit present.      Mental Status: She is alert.     Psychiatric:         Mood and Affect: Mood normal.         Behavior: Behavior normal.         Judgment: Judgment normal.                         [1]   Current Outpatient Medications:     benzonatate (TESSALON) 200 MG capsule, Take 1 capsule (200 mg total) by mouth 3 (three) times a day as needed for cough (Patient not taking: Reported on 4/7/2022), Disp: 20 capsule, Rfl: 0    fluticasone (FLONASE) 50 mcg/act nasal spray, 2 sprays into each nostril daily (Patient not taking: Reported on 4/7/2022), Disp: 16 g, Rfl: 0    ketoconazole (NIZORAL) 2 % cream, Apply topically daily, Disp: 30 g, Rfl: 3    levothyroxine 100 mcg tablet, Take 125 mcg by mouth daily (Patient not taking: Reported on 4/7/2022), Disp: , Rfl:     levothyroxine 150 mcg tablet, Take 150 mcg by mouth in the morning., Disp: , Rfl:     meloxicam (Mobic) 15 mg tablet, Take 1 tablet (15 mg total) by mouth daily (Patient not taking: Reported on 7/16/2024), Disp: 30 tablet, Rfl: 0    naproxen (NAPROSYN) 500 mg tablet, Take 1 tablet (500 mg total) by mouth 2 (two) times a day with meals (Patient not taking: Reported on 4/7/2022), Disp: 14 tablet, Rfl: 0    ondansetron (ZOFRAN-ODT) 4 mg disintegrating tablet, Take 1 tablet (4 mg total) by mouth every 6 (six) hours as needed for nausea or vomiting (Patient not taking: Reported on 4/7/2022), Disp: 20 tablet, Rfl: 0    tamsulosin (FLOMAX) 0.4 mg, Take 1 capsule (0.4 mg total) by mouth daily with dinner for 7 days, Disp: 7 capsule, Rfl: 0    Tea Tree 100 % OIL, Apply topically, Disp: , Rfl:   [2]   Past Medical History:  Diagnosis Date    Disease of thyroid gland     Hyperthyroidism    [3]   Past Surgical History:  Procedure Laterality Date    INGUINAL HERNIA REPAIR      KIDNEY STONE SURGERY      KNEE SURGERY  5/2018   [4]   Family History  Problem Relation Name Age of Onset    Diabetes Father Massimo Rubalcava

## 2025-06-26 NOTE — PATIENT INSTRUCTIONS
Soapy water soaks    Use a good moisturizer on your foot that would include Vaseline, Eucerin cream, Aveeno.  Do not use peroxide or Vicks on your feet      Follow-up with the podiatrist

## 2025-08-11 ENCOUNTER — TELEPHONE (OUTPATIENT)
Age: 55
End: 2025-08-11

## 2025-08-14 ENCOUNTER — OFFICE VISIT (OUTPATIENT)
Dept: PODIATRY | Facility: CLINIC | Age: 55
End: 2025-08-14